# Patient Record
Sex: MALE | Race: WHITE | NOT HISPANIC OR LATINO | Employment: FULL TIME | URBAN - METROPOLITAN AREA
[De-identification: names, ages, dates, MRNs, and addresses within clinical notes are randomized per-mention and may not be internally consistent; named-entity substitution may affect disease eponyms.]

---

## 2017-08-03 ENCOUNTER — TRANSCRIBE ORDERS (OUTPATIENT)
Dept: LAB | Facility: CLINIC | Age: 37
End: 2017-08-03

## 2017-08-03 ENCOUNTER — APPOINTMENT (OUTPATIENT)
Dept: LAB | Facility: CLINIC | Age: 37
End: 2017-08-03
Payer: COMMERCIAL

## 2017-08-03 DIAGNOSIS — Z00.8 HEALTH EXAMINATION IN POPULATION SURVEY: Primary | ICD-10-CM

## 2017-08-03 DIAGNOSIS — Z00.8 HEALTH EXAMINATION IN POPULATION SURVEY: ICD-10-CM

## 2017-08-03 LAB
CHOLEST SERPL-MCNC: 212 MG/DL (ref 50–200)
EST. AVERAGE GLUCOSE BLD GHB EST-MCNC: 111 MG/DL
HBA1C MFR BLD: 5.5 % (ref 4.2–6.3)
HDLC SERPL-MCNC: 32 MG/DL (ref 40–60)
LDLC SERPL CALC-MCNC: 135 MG/DL (ref 0–100)
TRIGL SERPL-MCNC: 225 MG/DL

## 2017-08-03 PROCEDURE — 83036 HEMOGLOBIN GLYCOSYLATED A1C: CPT

## 2017-08-03 PROCEDURE — 80061 LIPID PANEL: CPT

## 2017-08-03 PROCEDURE — 36415 COLL VENOUS BLD VENIPUNCTURE: CPT

## 2018-01-15 NOTE — MISCELLANEOUS
Message   Recorded as Task   Date: 05/25/2016 11:23 AM, Created By: Rosalinda Carvajal   Task Name: Med Renewal Request   Assigned To: Genaro inman,Team   Regarding Patient: Baron Gonsalez, Status: Active   Comment:    Rosalinda Carvajal - 25 May 2016 11:23 AM     TASK CREATED  Caller: [facility], Pharmacist; Renew Medication; [phone]  Nadine Potter from [address] [place] [address] left  today at [phone] that they received (2) e-RX for pt for gabapentin  1 RX is gabapentin 300mg take 1 capsule at HS and the other RX is gabapentin 300mg take 2 capsules at HS -> they want to know which one is correct? [facility] [phone]   Billy Sawyer - 25 May 2016 1:17 PM     TASK REPLIED TO: Previously Assigned To YESSICA inman,Team  one tablet qhs   Shirley Agosto - 25 May 2016 1:51 PM     TASK EDITED  S/W Nadine Potter at [facility] and advised of the same  She said they will disregard the RX that said take 2 capsules QHS  Active Problems    1  Arm pain, right (729 5) (M79 601)   2  Cervical disc disorder with radiculopathy, mid-cervical region (723 4) (M50 12)   3  Cervical paraspinal muscle spasm (728 85) (M62 838)   4  Cervical radiculopathy (723 4) (M54 12)   5  Cervical spondylosis without myelopathy (721 0) (M47 812)    Current Meds   1  Gabapentin 300 MG Oral Capsule; TAKE 1 CAPSULE AT BEDTIME NIGHTLY; Therapy: 02Xxy5667 to (Evaluate:[id number])  Requested for: [id number]; Last   Rx:[id number] Ordered   2  MethylPREDNISolone 4 MG Oral Tablet Therapy Pack; TAKE AS PRESRIBED; Therapy: 73Wci3144 to (Shiloh Campuzano)  Requested for: 29Xmx3503; Last   Rx:[id number] Ordered    Allergies    1   No Known Drug Allergies    Signatures   Electronically signed by : Gilford Golden, ; May 25 2016  1:51PM EST                       (Author)

## 2018-01-18 NOTE — MISCELLANEOUS
Message   Recorded as Task   Date: 11/02/2016 10:10 AM, Created By: [id number]   [employer]   Task Name: Medical Complaint Callback   Assigned To: YESSICA Sorensen   Regarding Patient: Jani Jurado, Status: Active   Comment:    [address] - 02 Nov 2016 10:10 AM     TASK CREATED  Caller: Stevie Sosa, Spouse; Medical Complaint  TC from pt's wife stating he's had a flare up of his right arm pain over the past 1-2 wks and is asking if a steroid pack can be sent to [facility]? He has intermittent numbness & tingling  Best c/b is her work # [phone]  Told pt's wife I would d/w Dr Robinson French  Will you prescribe or does pt need sovs?   Wonda Alpers - 02 Nov 2016 10:23 AM     TASK REPLIED TO: Previously Assigned To [facility],Team  e-rx sent for medrol dosepak to [facility] - 74 Nov 2016 10:40 AM     TASK EDITED  Pt's wife was informed that a medrol dosepak was sent to [facility]  Pt is not to take any anti-inflammatory meds while taking the medrol dosepak  Wife verbalized understanding and appreciative  Active Problems    1  Arm pain, right (729 5) (M79 601)   2  Cervical disc disorder with radiculopathy, mid-cervical region (723 4) (M50 12)   3  Cervical paraspinal muscle spasm (728 85) (M62 838)   4  Cervical radiculopathy (723 4) (M54 12)   5  Cervical spondylosis without myelopathy (721 0) (M47 812)    Current Meds   1  MethylPREDNISolone 4 MG Oral Tablet Therapy Pack (Medrol); TAKE AS PRESRIBED; Therapy: 02YEU2870 to (Bill Price)  Requested for: 30AVL5658; Last   Rx:[id number] Ordered    Allergies    1   No Known Drug Allergies    Signatures   Electronically signed by : Elias Almodovar, ; Nov 2 2016 10:40AM EST                       (Author)

## 2018-01-19 ENCOUNTER — ALLSCRIPTS OFFICE VISIT (OUTPATIENT)
Dept: OTHER | Facility: OTHER | Age: 38
End: 2018-01-19

## 2018-01-23 VITALS
BODY MASS INDEX: 26.95 KG/M2 | RESPIRATION RATE: 16 BRPM | HEART RATE: 80 BPM | WEIGHT: 199 LBS | SYSTOLIC BLOOD PRESSURE: 132 MMHG | DIASTOLIC BLOOD PRESSURE: 74 MMHG | TEMPERATURE: 97.8 F | HEIGHT: 72 IN
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
## 2018-03-26 ENCOUNTER — OFFICE VISIT (OUTPATIENT)
Dept: FAMILY MEDICINE CLINIC | Facility: CLINIC | Age: 38
End: 2018-03-26
Payer: COMMERCIAL

## 2018-03-26 VITALS
BODY MASS INDEX: 27.09 KG/M2 | RESPIRATION RATE: 16 BRPM | HEIGHT: 72 IN | HEART RATE: 88 BPM | WEIGHT: 200 LBS | SYSTOLIC BLOOD PRESSURE: 114 MMHG | DIASTOLIC BLOOD PRESSURE: 54 MMHG | TEMPERATURE: 98.7 F

## 2018-03-26 DIAGNOSIS — I48.0 PAROXYSMAL ATRIAL FIBRILLATION (HCC): Primary | ICD-10-CM

## 2018-03-26 PROCEDURE — 99214 OFFICE O/P EST MOD 30 MIN: CPT | Performed by: FAMILY MEDICINE

## 2018-03-26 PROCEDURE — 93000 ELECTROCARDIOGRAM COMPLETE: CPT | Performed by: FAMILY MEDICINE

## 2018-03-26 RX ORDER — ASPIRIN 81 MG/1
81 TABLET, CHEWABLE ORAL DAILY
Qty: 60 TABLET | Refills: 0 | Status: SHIPPED | OUTPATIENT
Start: 2018-03-26

## 2018-03-26 RX ORDER — METOPROLOL SUCCINATE 25 MG/1
25 TABLET, EXTENDED RELEASE ORAL DAILY
Qty: 60 TABLET | Refills: 0 | Status: SHIPPED | OUTPATIENT
Start: 2018-03-26 | End: 2018-05-29 | Stop reason: SDUPTHER

## 2018-03-26 RX ORDER — METHYLPREDNISOLONE 4 MG/1
TABLET ORAL
COMMUNITY
Start: 2016-11-02 | End: 2018-10-30 | Stop reason: SDUPTHER

## 2018-03-26 NOTE — PROGRESS NOTES
Assessment/Plan:  1  Atrial Fibrillation   -25 mg Metoprolol daily  -Cards referral  -ECHO   -TSH, CBC, BMP  -f/u in 4 weeks with PCP  -begin ASA 81mg daily    CHADs2-VAS 0  EKG in clinic shows rate 107 with irregularly irregular rhythm  Subjective:      Patient ID: Severiano Caper is a 44 y o  male  45 y/o male presents with 20 year histoy of intermittent palpitations  He states that the paipations are typically associated with exertion   He started getting palpations while in high school and they have not improved with age  Pt states that he feels that his heart is racing and that recently he has started to notice irregularities to the palpitations  Pt has no family hx or personal hx of a fib, CAD, or dysrhythmias   Palpitations are alleviated with holding his breath and breathing deeply and slowly  Denies Chest pain, SOB, exertional chest pain, palpitations with exertion, LE swelling, cough, dizziness associated with the palpitations, Hx or a fib  Palpitations   This is a chronic problem  The current episode started more than 1 year ago  The problem has been unchanged  Pertinent negatives include no abdominal pain, arthralgias, chest pain, chills, congestion, coughing, diaphoresis, fatigue, fever, headaches, nausea, numbness, rash, sore throat, vomiting or weakness  The symptoms are aggravated by exertion  The following portions of the patient's history were reviewed and updated as appropriate: allergies, current medications, past family history, past medical history, past social history, past surgical history and problem list     Review of Systems   Constitutional: Negative for chills, diaphoresis, fatigue and fever  HENT: Negative for congestion, ear discharge, facial swelling, hearing loss, rhinorrhea, sinus pain, sinus pressure, sneezing, sore throat, tinnitus and trouble swallowing  Eyes: Negative for pain, discharge and redness     Respiratory: Negative for cough, choking, chest tightness, shortness of breath, wheezing and stridor  Cardiovascular: Positive for palpitations  Negative for chest pain and leg swelling  Gastrointestinal: Negative for abdominal distention, abdominal pain, blood in stool, constipation, diarrhea, nausea and vomiting  Endocrine: Negative for cold intolerance, polydipsia and polyuria  Genitourinary: Negative for difficulty urinating, dysuria, enuresis, flank pain, frequency and hematuria  Musculoskeletal: Negative for arthralgias, back pain, gait problem and neck stiffness  Skin: Negative for rash and wound  Neurological: Negative for dizziness, seizures, syncope, weakness, numbness and headaches  Hematological: Negative for adenopathy  Psychiatric/Behavioral: Negative for agitation, confusion, hallucinations, sleep disturbance and suicidal ideas  All other systems reviewed and are negative  Objective: There were no vitals taken for this visit  Physical Exam   Constitutional: He is oriented to person, place, and time  He appears well-developed and well-nourished  No distress  HENT:   Head: Normocephalic and atraumatic  Eyes: Conjunctivae and EOM are normal  Pupils are equal, round, and reactive to light  Right eye exhibits no discharge  Left eye exhibits no discharge  Neck: Normal range of motion  Neck supple  Cardiovascular: Normal heart sounds and intact distal pulses  An irregularly irregular rhythm present  Tachycardia present  Exam reveals no gallop and no friction rub  No murmur heard  Pulmonary/Chest: Effort normal and breath sounds normal  No respiratory distress  He has no wheezes  He has no rales  He exhibits no tenderness  Abdominal: Soft  Bowel sounds are normal  He exhibits no distension and no mass  There is no tenderness  There is no rebound and no guarding  Musculoskeletal: He exhibits no edema or deformity  Neurological: He is alert and oriented to person, place, and time     Skin: Skin is warm and dry  No rash noted  He is not diaphoretic  No erythema  No pallor  Nursing note and vitals reviewed

## 2018-03-29 ENCOUNTER — APPOINTMENT (OUTPATIENT)
Dept: LAB | Facility: HOSPITAL | Age: 38
End: 2018-03-29
Payer: COMMERCIAL

## 2018-03-29 ENCOUNTER — HOSPITAL ENCOUNTER (OUTPATIENT)
Dept: NON INVASIVE DIAGNOSTICS | Facility: HOSPITAL | Age: 38
Discharge: HOME | End: 2018-03-29
Payer: COMMERCIAL

## 2018-03-29 DIAGNOSIS — I48.0 PAROXYSMAL ATRIAL FIBRILLATION (HCC): ICD-10-CM

## 2018-03-29 LAB
ANION GAP SERPL CALCULATED.3IONS-SCNC: 4 MMOL/L (ref 4–13)
BASOPHILS # BLD AUTO: 0.02 THOUSANDS/ΜL (ref 0–0.1)
BASOPHILS NFR BLD AUTO: 0 % (ref 0–1)
BUN SERPL-MCNC: 19 MG/DL (ref 5–25)
CALCIUM SERPL-MCNC: 9.4 MG/DL (ref 8.3–10.1)
CHLORIDE SERPL-SCNC: 108 MMOL/L (ref 100–108)
CO2 SERPL-SCNC: 28 MMOL/L (ref 21–32)
CREAT SERPL-MCNC: 0.83 MG/DL (ref 0.6–1.3)
EOSINOPHIL # BLD AUTO: 0.09 THOUSAND/ΜL (ref 0–0.61)
EOSINOPHIL NFR BLD AUTO: 1 % (ref 0–6)
ERYTHROCYTE [DISTWIDTH] IN BLOOD BY AUTOMATED COUNT: 13 % (ref 11.6–15.1)
GFR SERPL CREATININE-BSD FRML MDRD: 112 ML/MIN/1.73SQ M
GLUCOSE SERPL-MCNC: 87 MG/DL (ref 65–140)
HCT VFR BLD AUTO: 45.9 % (ref 36.5–49.3)
HGB BLD-MCNC: 15.5 G/DL (ref 12–17)
LYMPHOCYTES # BLD AUTO: 2.36 THOUSANDS/ΜL (ref 0.6–4.47)
LYMPHOCYTES NFR BLD AUTO: 32 % (ref 14–44)
MCH RBC QN AUTO: 31.7 PG (ref 26.8–34.3)
MCHC RBC AUTO-ENTMCNC: 33.8 G/DL (ref 31.4–37.4)
MCV RBC AUTO: 94 FL (ref 82–98)
MONOCYTES # BLD AUTO: 0.74 THOUSAND/ΜL (ref 0.17–1.22)
MONOCYTES NFR BLD AUTO: 10 % (ref 4–12)
NEUTROPHILS # BLD AUTO: 4.15 THOUSANDS/ΜL (ref 1.85–7.62)
NEUTS SEG NFR BLD AUTO: 57 % (ref 43–75)
NRBC BLD AUTO-RTO: 0 /100 WBCS
PLATELET # BLD AUTO: 298 THOUSANDS/UL (ref 149–390)
PMV BLD AUTO: 10.1 FL (ref 8.9–12.7)
POTASSIUM SERPL-SCNC: 4.2 MMOL/L (ref 3.5–5.3)
RBC # BLD AUTO: 4.89 MILLION/UL (ref 3.88–5.62)
SODIUM SERPL-SCNC: 140 MMOL/L (ref 136–145)
TSH SERPL DL<=0.05 MIU/L-ACNC: 1.6 UIU/ML (ref 0.36–3.74)
WBC # BLD AUTO: 7.38 THOUSAND/UL (ref 4.31–10.16)

## 2018-03-29 PROCEDURE — 85025 COMPLETE CBC W/AUTO DIFF WBC: CPT

## 2018-03-29 PROCEDURE — 93306 TTE W/DOPPLER COMPLETE: CPT | Performed by: INTERNAL MEDICINE

## 2018-03-29 PROCEDURE — 36415 COLL VENOUS BLD VENIPUNCTURE: CPT

## 2018-03-29 PROCEDURE — 93306 TTE W/DOPPLER COMPLETE: CPT

## 2018-03-29 PROCEDURE — 84443 ASSAY THYROID STIM HORMONE: CPT

## 2018-03-29 PROCEDURE — 80048 BASIC METABOLIC PNL TOTAL CA: CPT

## 2018-04-12 ENCOUNTER — OFFICE VISIT (OUTPATIENT)
Dept: CARDIOLOGY CLINIC | Facility: CLINIC | Age: 38
End: 2018-04-12
Payer: COMMERCIAL

## 2018-04-12 VITALS
WEIGHT: 199 LBS | HEART RATE: 76 BPM | DIASTOLIC BLOOD PRESSURE: 80 MMHG | SYSTOLIC BLOOD PRESSURE: 122 MMHG | HEIGHT: 72 IN | BODY MASS INDEX: 26.95 KG/M2 | OXYGEN SATURATION: 97 %

## 2018-04-12 DIAGNOSIS — I48.0 PAROXYSMAL ATRIAL FIBRILLATION (HCC): Primary | ICD-10-CM

## 2018-04-12 PROCEDURE — 93000 ELECTROCARDIOGRAM COMPLETE: CPT | Performed by: INTERNAL MEDICINE

## 2018-04-12 PROCEDURE — 99243 OFF/OP CNSLTJ NEW/EST LOW 30: CPT | Performed by: INTERNAL MEDICINE

## 2018-04-12 NOTE — PROGRESS NOTES
Consultation - Cardiology   Bharti Macias 44 y o  male MRN: [id number]    Encounter: [id number]    Assessment/Plan     Assessment:   Paroxysmal Atrial Fibrillation      Plan:    Paroxysmal Atrial Fibrillation: He is in normal sinus rhythm  [id number] = 0  Continue Metoprolol  Echocardiogram was normal  Check exercise stress test      History of Present Illness   Physician Requesting Consult: No att  providers found  Reason for Consult / Principal Problem: arrhythmia  HPI: Bharti Macias is a 44y o  year old male who presents with a history of arrhythmia since high school and has been intermittent  He typically has an episode of palpitations, lightheadedness with a wave of warmth  He sometimes holds his breath and it can improve  He has no prior history of syncope  He has a history of near syncope a few years ago  Recently he had an episode that was persistent  He went to his primary care physician and was found to be in atrial fibrillation  Today he is in sinus rhythm  He was started on Metoprolol and he has not had palpitations since starting the medication  No family history of CAD  Nonsmoker  Review of Systems   Constitution: Negative  HENT: Negative  Eyes: Negative  Cardiovascular: Negative  Respiratory: Negative  Endocrine: Negative  Hematologic/Lymphatic: Negative  Skin: Negative  Musculoskeletal: Negative  Gastrointestinal: Negative  Genitourinary: Negative  Neurological: Negative  Psychiatric/Behavioral: Negative  Allergic/Immunologic: Negative  Historical Information   Past Medical History:   Diagnosis Date    [facility]fib [facility])     Palpitations      History reviewed  No pertinent surgical history      Social History:  History   Alcohol Use    Yes     Comment: social      History   Drug Use No     History   Smoking Status    Never Smoker   Smokeless Tobacco    Never Used       Family History:   Family History   Problem Relation Age of Onset    Hyperlipidemia Father     Arrhythmia Maternal Grandfather     Atrial fibrillation Maternal Grandfather     Stroke Paternal Grandfather        Meds/Allergies   No Known Allergies    Current Outpatient Prescriptions:     aspirin 81 mg chewable tablet, Chew 1 tablet (81 mg total) daily, Disp: 60 tablet, Rfl: 0    metoprolol succinate (TOPROL-XL) 25 mg 24 hr tablet, Take 1 tablet (25 mg total) by mouth daily, Disp: 60 tablet, Rfl: 0    Methylprednisolone 4 MG TBPK, Take by mouth, Disp: , Rfl:     Vitals:   Pulse: 76  Blood Pressue: 122/80  Weight: 90 3 kg (199 lb)      Physical Exam   Constitutional: He is oriented to person, place, and time  No distress  HENT:   Mouth/Throat: No oropharyngeal exudate  Eyes: No scleral icterus  Neck: No JVD present  Cardiovascular: Normal rate and regular rhythm  No murmur heard  Pulmonary/Chest: Effort normal and breath sounds normal  No respiratory distress  He has no wheezes  He has no rales  Abdominal: Soft  Bowel sounds are normal  He exhibits no distension  There is no tenderness  There is no rebound  Musculoskeletal: He exhibits no edema  Neurological: He is alert and oriented to person, place, and time  Skin: Skin is warm and dry  He is not diaphoretic  Psychiatric: He has a normal mood and affect  His behavior is normal        [unfilled]    Invasive Devices          No matching active lines, drains, or airways          Lab Results   Component Value Date     03/29/2018     03/29/2018    CO2 28 03/29/2018    ANIONGAP 4 03/29/2018    BUN 19 03/29/2018    CREATININE 0 83 03/29/2018    EGFR 112 03/29/2018    GLUCOSE 87 03/29/2018    CALCIUM 9 4 03/29/2018     Lab Results   Component Value Date    WBC 7 38 03/29/2018    HGB 15 5 03/29/2018     03/29/2018     No components found for: TROP    Imaging:     EKG: Normal Sinus Rhythm  Normal ECG  Counseling / Coordination of Care  Total floor / unit time spent today 45 minutes    Greater than 50% of total time was spent with the patient and / or family counseling and / or coordination of care  A description of the counseling / coordination of care

## 2018-05-29 DIAGNOSIS — I48.0 PAROXYSMAL ATRIAL FIBRILLATION (HCC): ICD-10-CM

## 2018-05-29 RX ORDER — METOPROLOL SUCCINATE 25 MG/1
25 TABLET, EXTENDED RELEASE ORAL DAILY
Qty: 90 TABLET | Refills: 1 | Status: SHIPPED | OUTPATIENT
Start: 2018-05-29 | End: 2019-01-16 | Stop reason: SDUPTHER

## 2018-08-22 ENCOUNTER — TRANSCRIBE ORDERS (OUTPATIENT)
Dept: LAB | Facility: CLINIC | Age: 38
End: 2018-08-22

## 2018-08-22 ENCOUNTER — APPOINTMENT (OUTPATIENT)
Dept: LAB | Facility: CLINIC | Age: 38
End: 2018-08-22

## 2018-08-22 DIAGNOSIS — Z00.8 HEALTH EXAMINATION IN POPULATION SURVEY: ICD-10-CM

## 2018-08-22 DIAGNOSIS — Z00.8 HEALTH EXAMINATION IN POPULATION SURVEY: Primary | ICD-10-CM

## 2018-08-22 LAB
CHOLEST SERPL-MCNC: 206 MG/DL (ref 50–200)
EST. AVERAGE GLUCOSE BLD GHB EST-MCNC: 103 MG/DL
HBA1C MFR BLD: 5.2 % (ref 4.2–6.3)
HDLC SERPL-MCNC: 43 MG/DL (ref 40–60)
LDLC SERPL CALC-MCNC: 131 MG/DL (ref 0–100)
NONHDLC SERPL-MCNC: 163 MG/DL
TRIGL SERPL-MCNC: 160 MG/DL

## 2018-08-22 PROCEDURE — 83036 HEMOGLOBIN GLYCOSYLATED A1C: CPT

## 2018-08-22 PROCEDURE — 36415 COLL VENOUS BLD VENIPUNCTURE: CPT

## 2018-08-22 PROCEDURE — 80061 LIPID PANEL: CPT

## 2018-10-30 DIAGNOSIS — M54.2 NECK PAIN: Primary | ICD-10-CM

## 2018-10-30 RX ORDER — METHYLPREDNISOLONE 4 MG/1
TABLET ORAL
Qty: 21 TABLET | Refills: 0 | Status: SHIPPED | OUTPATIENT
Start: 2018-10-30 | End: 2019-08-18 | Stop reason: ALTCHOICE

## 2018-12-14 ENCOUNTER — OFFICE VISIT (OUTPATIENT)
Dept: FAMILY MEDICINE CLINIC | Facility: CLINIC | Age: 38
End: 2018-12-14
Payer: COMMERCIAL

## 2018-12-14 ENCOUNTER — HOSPITAL ENCOUNTER (OUTPATIENT)
Dept: RADIOLOGY | Age: 38
Discharge: HOME | End: 2018-12-14
Payer: COMMERCIAL

## 2018-12-14 VITALS
RESPIRATION RATE: 14 BRPM | HEIGHT: 72 IN | WEIGHT: 205.4 LBS | DIASTOLIC BLOOD PRESSURE: 68 MMHG | TEMPERATURE: 98.5 F | SYSTOLIC BLOOD PRESSURE: 114 MMHG | HEART RATE: 74 BPM | BODY MASS INDEX: 27.82 KG/M2

## 2018-12-14 DIAGNOSIS — N45.1 EPIDIDYMITIS: Primary | ICD-10-CM

## 2018-12-14 DIAGNOSIS — R30.0 DYSURIA: ICD-10-CM

## 2018-12-14 DIAGNOSIS — N45.1 EPIDIDYMITIS: ICD-10-CM

## 2018-12-14 LAB
SL AMB  POCT GLUCOSE, UA: NEGATIVE
SL AMB LEUKOCYTE ESTERASE,UA: ABNORMAL
SL AMB POCT BILIRUBIN,UA: NEGATIVE
SL AMB POCT BLOOD,UA: ABNORMAL
SL AMB POCT CLARITY,UA: CLEAR
SL AMB POCT COLOR,UA: ABNORMAL
SL AMB POCT KETONES,UA: NEGATIVE
SL AMB POCT NITRITE,UA: NEGATIVE
SL AMB POCT PH,UA: 5
SL AMB POCT SPECIFIC GRAVITY,UA: 1.03
SL AMB POCT URINE PROTEIN: ABNORMAL
SL AMB POCT UROBILINOGEN: 0.2

## 2018-12-14 PROCEDURE — 87491 CHLMYD TRACH DNA AMP PROBE: CPT | Performed by: FAMILY MEDICINE

## 2018-12-14 PROCEDURE — 87086 URINE CULTURE/COLONY COUNT: CPT | Performed by: FAMILY MEDICINE

## 2018-12-14 PROCEDURE — 1036F TOBACCO NON-USER: CPT | Performed by: FAMILY MEDICINE

## 2018-12-14 PROCEDURE — 81001 URINALYSIS AUTO W/SCOPE: CPT | Performed by: FAMILY MEDICINE

## 2018-12-14 PROCEDURE — 87591 N.GONORRHOEAE DNA AMP PROB: CPT | Performed by: FAMILY MEDICINE

## 2018-12-14 PROCEDURE — 81003 URINALYSIS AUTO W/O SCOPE: CPT | Performed by: FAMILY MEDICINE

## 2018-12-14 PROCEDURE — 3008F BODY MASS INDEX DOCD: CPT | Performed by: FAMILY MEDICINE

## 2018-12-14 PROCEDURE — 76870 US EXAM SCROTUM: CPT

## 2018-12-14 PROCEDURE — 99213 OFFICE O/P EST LOW 20 MIN: CPT | Performed by: FAMILY MEDICINE

## 2018-12-14 RX ORDER — CEFTRIAXONE SODIUM 250 MG/1
250 INJECTION, POWDER, FOR SOLUTION INTRAMUSCULAR; INTRAVENOUS ONCE
Status: COMPLETED | OUTPATIENT
Start: 2018-12-14 | End: 2018-12-14

## 2018-12-14 RX ORDER — DOXYCYCLINE 100 MG/1
100 CAPSULE ORAL 2 TIMES DAILY
Qty: 20 CAPSULE | Refills: 0 | Status: SHIPPED | OUTPATIENT
Start: 2018-12-14 | End: 2018-12-24

## 2018-12-14 RX ADMIN — CEFTRIAXONE SODIUM 250 MG: 250 INJECTION, POWDER, FOR SOLUTION INTRAMUSCULAR; INTRAVENOUS at 15:17
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
## 2018-12-14 NOTE — ASSESSMENT & PLAN NOTE
- Physical exam findings consistent with epididymitis, differential includes testicular torsion  - Ceftriaxone 250 mg IM given in office   - Doxycycline 100 mg BID for 10 days   - UA and urine gonorrhea/chlamydia sent   - STAT testicular/scrotum US with doppler ordered   - Return precautions discussed

## 2018-12-17 LAB

## 2018-12-18 LAB
C TRACH DNA SPEC QL NAA+PROBE: NEGATIVE
N GONORRHOEA DNA SPEC QL NAA+PROBE: NEGATIVE

## 2018-12-19 ENCOUNTER — TELEPHONE (OUTPATIENT)
Dept: FAMILY MEDICINE CLINIC | Facility: CLINIC | Age: 38
End: 2018-12-19

## 2018-12-19 LAB — BACTERIA UR CULT: NORMAL

## 2018-12-19 NOTE — TELEPHONE ENCOUNTER
Called patient to discuss results, no answer but left message to call back- please let him know all his results were normal  Thank you!

## 2019-01-16 DIAGNOSIS — I48.0 PAROXYSMAL ATRIAL FIBRILLATION (HCC): ICD-10-CM

## 2019-01-16 RX ORDER — METOPROLOL SUCCINATE 25 MG/1
25 TABLET, EXTENDED RELEASE ORAL DAILY
Qty: 90 TABLET | Refills: 1 | Status: SHIPPED | OUTPATIENT
Start: 2019-01-16 | End: 2019-07-31 | Stop reason: SDUPTHER

## 2019-02-04 ENCOUNTER — OFFICE VISIT (OUTPATIENT)
Dept: FAMILY MEDICINE CLINIC | Facility: CLINIC | Age: 39
End: 2019-02-04

## 2019-02-04 VITALS
TEMPERATURE: 98.3 F | HEIGHT: 72 IN | HEART RATE: 78 BPM | RESPIRATION RATE: 18 BRPM | DIASTOLIC BLOOD PRESSURE: 70 MMHG | WEIGHT: 214 LBS | SYSTOLIC BLOOD PRESSURE: 118 MMHG | BODY MASS INDEX: 28.99 KG/M2

## 2019-02-04 DIAGNOSIS — I48.91 ATRIAL FIBRILLATION, UNSPECIFIED TYPE (HCC): Primary | ICD-10-CM

## 2019-02-04 PROCEDURE — 99213 OFFICE O/P EST LOW 20 MIN: CPT | Performed by: FAMILY MEDICINE

## 2019-02-04 NOTE — PROGRESS NOTES
Merary Gonzalez 1980 male MRN: [id number]    Family Medicine Acute Visit    ASSESSMENT/PLAN   Problem List Items Addressed This Visit        Cardiovascular and Mediastinum    A-fib ([facility] ) - Primary     Asymptomatic  POCT EKG-12 lead: NSR, no acute changes   Continue Metoprolol succinate 25 mg daily  Continue to f/u w/ Cardiologist as scheduled  No further testing   - used to consume excessive caffeine from soda in the past, that might be a trigger for PAF  No more caffeine-use since he was diagnosed with PAF  - no medical restrictions for work  - pt is cleared for public treat of injury or illness arising from PAF    F/U as needed  SUBJECTIVE  CC: Follow-up (A-FIB)      HPI:  Merary Gonzalez is a 43 y o  male who presents for PAF f/u  Asymptomatic for a long time  F/u w/ cardiologist recently, stable on Metoprolol  Review of Systems   Constitutional: Negative for chills, diaphoresis, fatigue and fever  Eyes: Negative for visual disturbance  Respiratory: Negative for cough, shortness of breath and wheezing  Cardiovascular: Negative for chest pain and palpitations  Gastrointestinal: Negative for abdominal pain  Neurological: Negative for dizziness, weakness, light-headedness and numbness  Psychiatric/Behavioral: The patient is not nervous/anxious  Historical Information   The patient history was reviewed and updated as follows:  Past Medical History:   Diagnosis Date    A-fib ([facility] )     Palpitations          History reviewed  No pertinent surgical history    Family History   Problem Relation Age of Onset    Hyperlipidemia Father     Arrhythmia Maternal Grandfather     Atrial fibrillation Maternal Grandfather     Stroke Paternal Grandfather       Social History   History   Alcohol Use    Yes     Comment: social      History   Drug Use No     History   Smoking Status    Never Smoker   Smokeless Tobacco    Never Used       Medications:     Current Outpatient Prescriptions:   metoprolol succinate (TOPROL-XL) 25 mg 24 hr tablet, Take 1 tablet (25 mg total) by mouth daily, Disp: 90 tablet, Rfl: 1    aspirin 81 mg chewable tablet, Chew 1 tablet (81 mg total) daily (Patient not taking: Reported on 12/14/2018 ), Disp: 60 tablet, Rfl: 0    Methylprednisolone 4 MG TBPK, Take 24 mg by mouth on day 1, then decreasing by 4mg/day for next 5 days per package instruction (Patient not taking: Reported on 2/4/2019 ), Disp: 21 tablet, Rfl: 0    No Known Allergies    OBJECTIVE  Vitals:   Vitals:    02/04/19 1528   BP: 118/70   Pulse: 78   Resp: 18   Temp: 98 3 °F (36 8 °C)   Weight: 97 1 kg (214 lb)   Height: 6' (1 829 m)         Physical Exam   Constitutional: He is oriented to person, place, and time  He appears well-developed and well-nourished  No distress  Cardiovascular: Normal rate and regular rhythm  No murmur heard  Pulmonary/Chest: Effort normal and breath sounds normal  No respiratory distress  He has no wheezes  He has no rales  Abdominal: Soft  Bowel sounds are normal    Musculoskeletal: Normal range of motion  He exhibits no edema  Neurological: He is alert and oriented to person, place, and time  Skin: Skin is warm  He is not diaphoretic  No erythema  Psychiatric: He has a normal mood and affect  Nursing note and vitals reviewed       Monique Sanabria MD, PGY-2  [facility]   2/4/2019

## 2019-02-04 NOTE — ASSESSMENT & PLAN NOTE
Asymptomatic  POCT EKG-12 lead: NSR, no acute changes   Continue Metoprolol succinate 25 mg daily  Continue to f/u w/ Cardiologist as scheduled  No further testing   - used to consume excessive caffeine from soda in the past, that might be a trigger for PAF  No more caffeine-use since he was diagnosed with PAF  - no medical restrictions for work  - pt is cleared for public treat of injury or illness arising from PAF    F/U as needed

## 2019-07-31 DIAGNOSIS — I48.0 PAROXYSMAL ATRIAL FIBRILLATION (HCC): ICD-10-CM

## 2019-07-31 RX ORDER — METOPROLOL SUCCINATE 25 MG/1
25 TABLET, EXTENDED RELEASE ORAL DAILY
Qty: 90 TABLET | Refills: 2 | Status: SHIPPED | OUTPATIENT
Start: 2019-07-31 | End: 2020-05-13 | Stop reason: SDUPTHER

## 2019-08-18 ENCOUNTER — OFFICE VISIT (OUTPATIENT)
Dept: URGENT CARE | Age: 39
End: 2019-08-18
Payer: COMMERCIAL

## 2019-08-18 VITALS
HEART RATE: 76 BPM | DIASTOLIC BLOOD PRESSURE: 72 MMHG | TEMPERATURE: 97.6 F | RESPIRATION RATE: 18 BRPM | SYSTOLIC BLOOD PRESSURE: 136 MMHG | OXYGEN SATURATION: 96 % | WEIGHT: 223 LBS | BODY MASS INDEX: 30.2 KG/M2 | HEIGHT: 72 IN

## 2019-08-18 DIAGNOSIS — M54.2 CERVICALGIA: Primary | ICD-10-CM

## 2019-08-18 PROCEDURE — S9088 SERVICES PROVIDED IN URGENT: HCPCS | Performed by: FAMILY MEDICINE

## 2019-08-18 PROCEDURE — 99213 OFFICE O/P EST LOW 20 MIN: CPT | Performed by: FAMILY MEDICINE

## 2019-08-18 RX ORDER — PREDNISONE 10 MG/1
TABLET ORAL
Qty: 21 TABLET | Refills: 0 | Status: SHIPPED | OUTPATIENT
Start: 2019-08-18 | End: 2021-12-01 | Stop reason: SDUPTHER

## 2019-08-18 NOTE — PROGRESS NOTES
[email or web address] [facility]        NAME: Delvin Sanchez is a 35 y o  male  : 1980    MRN: [id number]  DATE: 2019  TIME: 6:19 PM    Assessment and Plan   Cervicalgia [M54 2]  1  Cervicalgia  predniSONE 10 mg tablet         Patient Instructions       Follow up with PCP in 3-5 days  Proceed to  ER if symptoms worsen  Chief Complaint     Chief Complaint   Patient presents with    Neck Pain     Pt  has an exacerbation of inflammation from a herniated cervical disc for 3 days  Usually takes a steroid and it improves  having difficulty holding head up  and right shoulder is higher than his left  History of Present Illness       Patient is here for evaluation of neck pain  Patient has a history of herniated disc in neck and has had flare ups like this in the past   States usually he is on a course of steroids which helped relieve the symptoms  He does follow up with a specialist       Review of Systems   Review of Systems   Constitutional: Negative  Musculoskeletal: Positive for neck pain and neck stiffness  Neurological: Negative for weakness and numbness           Current Medications       Current Outpatient Medications:     metoprolol succinate (TOPROL-XL) 25 mg 24 hr tablet, Take 1 tablet (25 mg total) by mouth daily, Disp: 90 tablet, Rfl: 2    aspirin 81 mg chewable tablet, Chew 1 tablet (81 mg total) daily (Patient not taking: Reported on 2018 ), Disp: 60 tablet, Rfl: 0    predniSONE 10 mg tablet, Six tablets day 1; 5 tablets day to; 4 tablets day 3; 3 tablets day 4; 2 tablets day 5; and 1 tablet day 6, Disp: 21 tablet, Rfl: 0    Current Allergies     Allergies as of 2019    (No Known Allergies)            The following portions of the patient's history were reviewed and updated as appropriate: allergies, current medications, past family history, past medical history, past social history, past surgical history and problem list      Past Medical History:   Diagnosis Date    A-fib (HCC)     Bulging of cervical intervertebral disc     Herniated disc, cervical     Palpitations        History reviewed  No pertinent surgical history  Family History   Problem Relation Age of Onset    Hyperlipidemia Father     Arrhythmia Maternal Grandfather     Atrial fibrillation Maternal Grandfather     Stroke Paternal Grandfather          Medications have been verified  Objective   /72 (BP Location: Left arm, Patient Position: Sitting, Cuff Size: Large)   Pulse 76   Temp 97 6 °F (36 4 °C) (Temporal)   Resp 18   Ht 6' (1 829 m)   Wt 101 kg (223 lb)   SpO2 96%   BMI 30 24 kg/m²        Physical Exam     Physical Exam   Constitutional: He is oriented to person, place, and time  He appears well-developed and well-nourished  No distress  HENT:   Head: Normocephalic and atraumatic  Neck:   Range of motion of the cervical spine limited  Tenderness over the right cervical paravertebral muscles  Musculoskeletal:   Strength 5/5 bilaterally upper extremity  Neurological: He is alert and oriented to person, place, and time  He displays normal reflexes  No sensory deficit  Skin: Skin is warm and dry  He is not diaphoretic  Psychiatric: He has a normal mood and affect  His behavior is normal  Judgment and thought content normal    Nursing note and vitals reviewed

## 2019-08-19 ENCOUNTER — OFFICE VISIT (OUTPATIENT)
Dept: FAMILY MEDICINE CLINIC | Facility: CLINIC | Age: 39
End: 2019-08-19

## 2019-08-19 VITALS
TEMPERATURE: 97.7 F | RESPIRATION RATE: 16 BRPM | HEART RATE: 80 BPM | HEIGHT: 72 IN | BODY MASS INDEX: 30.02 KG/M2 | DIASTOLIC BLOOD PRESSURE: 80 MMHG | WEIGHT: 221.6 LBS | SYSTOLIC BLOOD PRESSURE: 130 MMHG

## 2019-08-19 DIAGNOSIS — M54.2 NECK PAIN: Primary | ICD-10-CM

## 2019-08-19 PROCEDURE — 99213 OFFICE O/P EST LOW 20 MIN: CPT | Performed by: FAMILY MEDICINE

## 2019-08-19 PROCEDURE — 1036F TOBACCO NON-USER: CPT | Performed by: FAMILY MEDICINE

## 2019-08-19 PROCEDURE — 3008F BODY MASS INDEX DOCD: CPT | Performed by: FAMILY MEDICINE

## 2019-08-19 RX ORDER — KETOROLAC TROMETHAMINE 30 MG/ML
30 INJECTION, SOLUTION INTRAMUSCULAR; INTRAVENOUS ONCE
Status: COMPLETED | OUTPATIENT
Start: 2019-08-19 | End: 2019-08-19

## 2019-08-19 RX ADMIN — KETOROLAC TROMETHAMINE 30 MG: 30 INJECTION, SOLUTION INTRAMUSCULAR; INTRAVENOUS at 10:52

## 2019-08-19 NOTE — LETTER
August 19, 2019     Patient: Clemente Kwok   YOB: 1980   Date of Visit: 8/19/2019       To Whom it May Concern:    Clemente Kwok is under my professional care  He was seen in my office on 8/19/2019  He may return to work on August 20, 2019  If you have any questions or concerns, please don't hesitate to call           Sincerely,          Servando Gallegos MD        CC: No Recipients

## 2019-08-19 NOTE — PROGRESS NOTES
Assessment/Plan:     Neck Pain  Patient denotes improvement with prednisone due to neck pain likely associated with patients cervical disc herniation  Patient should continue course of prednisone  Patient to receive IM Toradol for symptomatic relief  Discussed role OMT therapy can play if symptoms recur or become more frequent  Subjective:  Chief Complaint   Patient presents with    Neck Pain        Patient ID: Saul Caba is a 35 y o  male  Patient is presenting with increased pain due to cervical disc herniation  He was diagnosed with C6-7 disc herniation 3 years ago and rejected surgical options  Patient says that pain is typically well controlled with the exception of ocasional flare-ups where he has a decrease in cervical motion with pain radiating down upper extremity  He also endorses that his head "feels heavy"  Patient stated that current episode of pain began 6 days ago with no acute trigger  Patient reports progressive worsening of pain leading to a visit to urgent care yesterday where he was given prednisone 10 mg tablets to be taken 6 times daily  Patient reports taking 3 tablets last night and 6 tablets this morning leading to gradual improvement in symptoms  Patient's wife is nurse and recommended IM Toradol for pain relief  Review of Systems   Constitutional: Negative  Respiratory: Negative  Cardiovascular: Negative  Objective:  Vitals:    08/19/19 1012   BP: 130/80   Pulse: 80   Resp: 16   Temp: 97 7 °F (36 5 °C)       Physical Exam   Constitutional: He is oriented to person, place, and time  Cardiovascular: Normal rate, normal heart sounds and intact distal pulses  Pulmonary/Chest: Effort normal and breath sounds normal    Musculoskeletal:        Right shoulder: He exhibits spasm          Arms:  Sitting with neck in hyperflexion, SCM on right side enlarged compared to left  Palpable spasm over right trapezius    Neurological: He is alert and oriented to person, place, and time  He displays normal reflexes          Shoulder asymmetry: Right shoulder height about 2-3 inches superior to left shoulder

## 2020-02-21 ENCOUNTER — OFFICE VISIT (OUTPATIENT)
Dept: FAMILY MEDICINE CLINIC | Facility: CLINIC | Age: 40
End: 2020-02-21

## 2020-02-21 VITALS
RESPIRATION RATE: 16 BRPM | BODY MASS INDEX: 29.42 KG/M2 | TEMPERATURE: 99.6 F | HEART RATE: 80 BPM | SYSTOLIC BLOOD PRESSURE: 110 MMHG | DIASTOLIC BLOOD PRESSURE: 72 MMHG | HEIGHT: 72 IN | WEIGHT: 217.2 LBS

## 2020-02-21 DIAGNOSIS — R68.89 FLU-LIKE SYMPTOMS: Primary | ICD-10-CM

## 2020-02-21 PROBLEM — J00 COMMON COLD: Status: ACTIVE | Noted: 2020-02-21

## 2020-02-21 PROCEDURE — 3008F BODY MASS INDEX DOCD: CPT | Performed by: FAMILY MEDICINE

## 2020-02-21 PROCEDURE — 1036F TOBACCO NON-USER: CPT | Performed by: FAMILY MEDICINE

## 2020-02-21 PROCEDURE — 99213 OFFICE O/P EST LOW 20 MIN: CPT | Performed by: FAMILY MEDICINE

## 2020-02-21 NOTE — ASSESSMENT & PLAN NOTE
- The patient presents with a 4 day history of common cold symptoms including productive cough, general malaise, and fatigue    - Etiology is likely viral since patient reports his symptoms have improved over the last few days with rest and analgesics   - Encouraged the patient to maintain adequate hydration with sufficient fluid intake to prevent dehydration symptoms  - Recommended analgesics for relief of malaise, headache, fever, and general body aches  - Instructed the patient to return to clinic if his symptoms do not improve within the next week or if his symptoms worsen  - Counseled patient on ER parameters and the benefits of the flu vaccine  - Provided the patient with a work absence note  Patient may return to work on Monday

## 2020-02-21 NOTE — LETTER
February 21, 2020     Patient: Megan Og   YOB: 1980   Date of Visit: 2/21/2020       To Whom it May Concern:    Megan Og is under my professional care  He was seen in my office on 2/21/2020  Patient has symptom consistent with influenza starting on 2/18/2020  As this is an infectious disease, he is excused from work between 2/18/2020 to 2/23/2020  He may return to work on 2/24/2020 provided he has been fever free for 24 hours  If you have any questions or concerns, please don't hesitate to call           Sincerely,          Lynette Medina MD        CC: No Recipients

## 2020-02-21 NOTE — PROGRESS NOTES
Assessment/Plan:    Flu-like symptoms  - The patient presents with a 4 day history of flu-like symptoms including productive cough, general malaise, and fatigue    - Etiology is likely viral since patient reports his symptoms have improved over the last few days with rest and analgesics   - Encouraged the patient to maintain adequate hydration with sufficient fluid intake to prevent dehydration symptoms, recommend at least 2L, 64oz a day  - Recommended analgesics for relief of malaise, headache, fever, and general body aches  - Instructed the patient to return to clinic if his symptoms do not improve within the next week or if his symptoms worsen  - Counseled patient on ER parameters and the benefits of the flu vaccine  - Provided the patient with a work absence note  Patient may return to work on Monday  Subjective:      Patient ID: Echo Dash is a 35 y o  male who is here today for assessment of flu like symptoms for the last 4 days  He reports general improvement in his symptoms over the last few days  His current symptoms include productive cough with white to green phlegm, general malaise, and fatigue  The patient currently does not have a fever  He had a fever on Tuesday and Wednesday with Tmax was 101  He also previously experienced chills associated with his fever, sore throat, and nasal congestion  He has been self-medicating himself with Advil 800 mg q12h and Nyquil at bedtime which have provided symptomatic relief  The patient reports that he has decreased PO intake and has been eating and drinking less than his normal amount  He has experienced decrease urine output  He has not had a bowl movement since Wednesday which he describes as loose stools  He is currently experiencing symptoms of dehydration including lightheadedness and dizziness  The patient has missed the last 3 days of work  He is requesting a work absence note today  He feels that he can return to work on Monday       Of note, the patient has not received his annual flu vaccine this year  He was informed of the benefits of the flu vaccine but is not interested in receiving the vaccine  He does have sick contacts at work  He denies recent travels and tobacco use/exposure  Review of Systems   Constitutional: Positive for appetite change and fatigue  Negative for chills and fever  HENT: Negative for congestion, ear pain, hearing loss, postnasal drip, rhinorrhea, sinus pressure, sinus pain, sneezing, sore throat and trouble swallowing  Hoarseness   Eyes: Negative for pain, discharge, redness, itching and visual disturbance  Respiratory: Positive for cough (productive cough)  Negative for chest tightness, shortness of breath and wheezing  Cardiovascular: Negative for chest pain, palpitations and leg swelling  Gastrointestinal: Positive for diarrhea (2 episodes of loose stools)  Negative for abdominal pain, blood in stool, constipation, nausea and vomiting  Genitourinary: Negative for decreased urine volume, dysuria, frequency, hematuria and urgency  Musculoskeletal: Negative for back pain, joint swelling, neck pain and neck stiffness  General body aches   Skin: Negative  Neurological: Positive for dizziness and light-headedness  Negative for syncope and headaches  Psychiatric/Behavioral: Negative  Negative for self-injury, sleep disturbance and suicidal ideas  Objective:    /72 (BP Location: Left arm, Patient Position: Sitting, Cuff Size: Large)   Pulse 80   Temp 99 6 °F (37 6 °C) (Tympanic)   Resp 16   Ht 6' (1 829 m)   Wt 98 5 kg (217 lb 3 2 oz)   BMI 29 46 kg/m²      Physical Exam   Constitutional: He appears well-developed and well-nourished  HENT:   Head: Normocephalic and atraumatic  Nose: Nose normal    Eyes: Right eye exhibits no discharge  Left eye exhibits no discharge  Neck: Neck supple  Cardiovascular: Normal rate and regular rhythm     No murmur heard   Pulmonary/Chest: Effort normal and breath sounds normal  No stridor  No respiratory distress  He has no wheezes  Abdominal: Soft  Bowel sounds are normal  He exhibits no distension  Musculoskeletal: He exhibits no edema, tenderness or deformity  Lymphadenopathy:     He has no cervical adenopathy  Neurological: He is alert  Skin: Skin is warm and dry  Capillary refill takes less than 2 seconds  No rash noted  No erythema  No pallor  Psychiatric: He has a normal mood and affect  Vitals reviewed  JESSICA Bradshaw    Family Medicine, PGY-2

## 2020-02-21 NOTE — PATIENT INSTRUCTIONS
Influenza   AMBULATORY CARE:   Influenza  (the flu) is an infection caused by the influenza virus  The flu is easily spread when an infected person coughs, sneezes, or has close contact with others  You may be able to spread the flu to others for 1 week or longer after signs or symptoms appear  Common signs and symptoms include the following:   · Fever and chills    · Headaches, body aches, and muscle or joint pain    · Cough, runny nose, and sore throat    · Loss of appetite, nausea, vomiting, or diarrhea    · Tiredness    · Trouble breathing  Call 911 for any of the following:   · You have trouble breathing, and your lips look purple or blue  · You have a seizure  Seek care immediately if:   · You are dizzy, or you are urinating less or not at all  · You have a headache with a stiff neck, and you feel tired or confused  · You have new pain or pressure in your chest     · Your symptoms, such as shortness of breath, vomiting, or diarrhea, get worse  · Your symptoms, such as fever and coughing, seem to get better, but then get worse  Contact your healthcare provider if:   · You have new muscle pain or weakness  · You have questions or concerns about your condition or care  Treatment for influenza  may include any of the following:  · Acetaminophen  decreases pain and fever  It is available without a doctor's order  Ask how much to take and how often to take it  Follow directions  Acetaminophen can cause liver damage if not taken correctly  · NSAIDs , such as ibuprofen, help decrease swelling, pain, and fever  This medicine is available with or without a doctor's order  NSAIDs can cause stomach bleeding or kidney problems in certain people  If you take blood thinner medicine, always ask your healthcare provider if NSAIDs are safe for you  Always read the medicine label and follow directions  · Antivirals  help fight a viral infection    Manage your symptoms:   · Rest  as much as you can to help you recover  · Drink liquids as directed  to help prevent dehydration  Ask how much liquid to drink each day and which liquids are best for you  Prevent the spread of the flu:   · Wash your hands often  Use soap and water  Wash your hands after you use the bathroom, change a child's diapers, or sneeze  Wash your hands before you prepare or eat food  Use gel hand cleanser when soap and water are not available  Do not touch your eyes, nose, or mouth unless you have washed your hands first            · Cover your mouth when you sneeze or cough  Cough into a tissue or the bend of your arm  · Clean shared items with a germ-killing   Clean table surfaces, doorknobs, and light switches  Do not share towels, silverware, and dishes with people who are sick  Wash bed sheets, towels, silverware, and dishes with soap and water  · Wear a mask  over your mouth and nose if you are sick or are near anyone who is sick  · Stay away from others  if you are sick  · Influenza vaccine  helps prevent influenza (flu)  Everyone older than 6 months should get a yearly influenza vaccine  Get the vaccine as soon as it is available, usually in September or October each year  Follow up with your healthcare provider as directed:  Write down your questions so you remember to ask them during your visits  © 2017 [facility] Information is for End User's use only and may not be sold, redistributed or otherwise used for commercial purposes  All illustrations and images included in CareNotes® are the copyrighted property of A D [employer] M [employer] Inc  or [employer]  The above information is an  only  It is not intended as medical advice for individual conditions or treatments  Talk to your doctor, nurse or pharmacist before following any medical regimen to see if it is safe and effective for you

## 2020-05-13 DIAGNOSIS — I48.0 PAROXYSMAL ATRIAL FIBRILLATION (HCC): ICD-10-CM

## 2020-05-13 RX ORDER — METOPROLOL SUCCINATE 25 MG/1
25 TABLET, EXTENDED RELEASE ORAL DAILY
Qty: 90 TABLET | Refills: 2 | Status: SHIPPED | OUTPATIENT
Start: 2020-05-13 | End: 2021-02-15 | Stop reason: SDUPTHER

## 2021-02-15 DIAGNOSIS — I48.0 PAROXYSMAL ATRIAL FIBRILLATION (HCC): ICD-10-CM

## 2021-02-15 RX ORDER — METOPROLOL SUCCINATE 25 MG/1
25 TABLET, EXTENDED RELEASE ORAL DAILY
Qty: 90 TABLET | Refills: 0 | Status: SHIPPED | OUTPATIENT
Start: 2021-02-15 | End: 2021-05-10

## 2021-03-10 DIAGNOSIS — Z23 ENCOUNTER FOR IMMUNIZATION: ICD-10-CM

## 2021-03-21 ENCOUNTER — IMMUNIZATIONS (OUTPATIENT)
Dept: FAMILY MEDICINE CLINIC | Facility: HOSPITAL | Age: 41
End: 2021-03-21

## 2021-03-21 DIAGNOSIS — Z23 ENCOUNTER FOR IMMUNIZATION: Primary | ICD-10-CM

## 2021-03-21 PROCEDURE — 0001A SARS-COV-2 / COVID-19 MRNA VACCINE (PFIZER-BIONTECH) 30 MCG: CPT

## 2021-03-21 PROCEDURE — 91300 SARS-COV-2 / COVID-19 MRNA VACCINE (PFIZER-BIONTECH) 30 MCG: CPT

## 2021-04-11 ENCOUNTER — IMMUNIZATIONS (OUTPATIENT)
Dept: FAMILY MEDICINE CLINIC | Facility: HOSPITAL | Age: 41
End: 2021-04-11

## 2021-04-11 DIAGNOSIS — Z23 ENCOUNTER FOR IMMUNIZATION: Primary | ICD-10-CM

## 2021-04-11 PROCEDURE — 0002A SARS-COV-2 / COVID-19 MRNA VACCINE (PFIZER-BIONTECH) 30 MCG: CPT

## 2021-04-11 PROCEDURE — 91300 SARS-COV-2 / COVID-19 MRNA VACCINE (PFIZER-BIONTECH) 30 MCG: CPT

## 2021-05-09 DIAGNOSIS — I48.0 PAROXYSMAL ATRIAL FIBRILLATION (HCC): ICD-10-CM

## 2021-05-10 ENCOUNTER — OFFICE VISIT (OUTPATIENT)
Dept: FAMILY MEDICINE CLINIC | Facility: CLINIC | Age: 41
End: 2021-05-10

## 2021-05-10 VITALS
BODY MASS INDEX: 31.26 KG/M2 | RESPIRATION RATE: 18 BRPM | HEIGHT: 72 IN | DIASTOLIC BLOOD PRESSURE: 80 MMHG | TEMPERATURE: 98.5 F | OXYGEN SATURATION: 96 % | WEIGHT: 230.8 LBS | SYSTOLIC BLOOD PRESSURE: 130 MMHG | HEART RATE: 90 BPM

## 2021-05-10 DIAGNOSIS — K21.9 GASTROESOPHAGEAL REFLUX DISEASE WITHOUT ESOPHAGITIS: ICD-10-CM

## 2021-05-10 DIAGNOSIS — E66.09 CLASS 1 OBESITY DUE TO EXCESS CALORIES WITHOUT SERIOUS COMORBIDITY WITH BODY MASS INDEX (BMI) OF 31.0 TO 31.9 IN ADULT: ICD-10-CM

## 2021-05-10 DIAGNOSIS — I48.91 ATRIAL FIBRILLATION, UNSPECIFIED TYPE (HCC): Primary | ICD-10-CM

## 2021-05-10 PROCEDURE — 99213 OFFICE O/P EST LOW 20 MIN: CPT | Performed by: FAMILY MEDICINE

## 2021-05-10 PROCEDURE — 3725F SCREEN DEPRESSION PERFORMED: CPT | Performed by: FAMILY MEDICINE

## 2021-05-10 RX ORDER — METOPROLOL SUCCINATE 25 MG/1
TABLET, EXTENDED RELEASE ORAL
Qty: 90 TABLET | Refills: 0 | Status: SHIPPED | OUTPATIENT
Start: 2021-05-10 | End: 2021-08-13 | Stop reason: SDUPTHER

## 2021-05-10 RX ORDER — FAMOTIDINE 20 MG/1
20 TABLET, FILM COATED ORAL 2 TIMES DAILY
Qty: 30 TABLET | Refills: 1 | Status: SHIPPED | OUTPATIENT
Start: 2021-05-10

## 2021-05-10 NOTE — ASSESSMENT & PLAN NOTE
Acid reflux symptoms well controlled on Nexium  Patient has been using Nexium for roughly 1 year  Discussed lifestyle modification to improve as reflux symptoms including limiting caffeine use and avoiding reclining/sleeping immediately after meals  Will switch Nexium to famotidine for next 2 weeks and observe symptoms  Weight loss may help with the symptoms

## 2021-05-10 NOTE — PROGRESS NOTES
Assessment/Plan:    A-fib [facility])  Patient with paroxysmal atrial fibrillation diagnosed in 2018 well controlled on metoprolol  Continue this medication  Patient may consider starting aspirin 81 mg  Patient denies any history of blood clots including DVT or PE  Due to concern about exertional dyspnea (this may be secondary to deconditioning during pandemic or beta-blocker use), will repeat echocardiogram at this time  Will also obtain stress test per recommendation from Cardiology back in 2018    Gastroesophageal reflux disease without esophagitis  Acid reflux symptoms well controlled on Nexium  Patient has been using Nexium for roughly 1 year  Discussed lifestyle modification to improve as reflux symptoms including limiting caffeine use and avoiding reclining/sleeping immediately after meals  Will switch Nexium to famotidine for next 2 weeks and observe symptoms  Weight loss may help with the symptoms        Subjective:      Patient ID: Umesh Tim is a 38 y o  male  HPI    79-year-old male patient presents for follow-up regarding his chronic atrial fibrillation as well as acid reflux symptoms  Patient has a history of arrhythmia since he was a teenager, has been evaluated by Cardiology, was diagnosed with paroxysmal atrial fibrillation in 2018  Patient reports having occasional palpitations symptoms but has not noticed any increased frequency  Patient's echocardiogram was last completed in 2018 showing ejection fraction of 60%  without regional wall motion abnormality or evidence of concentric hypertrophy  Patient is concerned about some exertional dyspnea, specifically feels more winded after climbing 2 flights of stairs holding the laundry basket  Patient's only medication is metoprolol tartrate 25 mg daily, and has been compliant with this medication  Patient does not drink excess amount of caffeine, occasionally drinks [facility] during the day and occasional sweet tea and night      Patient has not been taking aspirin 81 mg tablet  Patient has been using Nexium for roughly 1 ER to control his as reflux symptoms  Patient does eat a large dinner around 7:00 p m  And recline and rest afterwards and occasionally fall asleep  Review of Systems   Constitutional: Negative for chills and fatigue  HENT: Negative for congestion, rhinorrhea and sore throat  Respiratory: Negative for shortness of breath  Cardiovascular: Negative for chest pain and palpitations  Gastrointestinal: Positive for nausea  Negative for abdominal pain, constipation, diarrhea and vomiting  Occasional nausea with distant history of emesis secondary to acid reflux   Genitourinary: Negative for dysuria and urgency  Neurological: Negative for dizziness, light-headedness and headaches  Psychiatric/Behavioral: Negative for sleep disturbance  Objective:    /80 (BP Location: Right arm, Patient Position: Sitting, Cuff Size: Standard)   Pulse 90   Temp 98 5 °F (36 9 °C) (Tympanic)   Resp 18   Ht 6' (1 829 m)   Wt 105 kg (230 lb 12 8 oz)   SpO2 96%   BMI 31 30 kg/m²       Physical Exam  Vitals signs reviewed  Constitutional:       General: He is not in acute distress  Appearance: Normal appearance  He is obese  He is not ill-appearing, toxic-appearing or diaphoretic  HENT:      Head: Normocephalic  Cardiovascular:      Rate and Rhythm: Normal rate and regular rhythm  Pulses: Normal pulses  Heart sounds: Normal heart sounds  No murmur  Pulmonary:      Effort: Pulmonary effort is normal  No respiratory distress  Breath sounds: Normal breath sounds  Abdominal:      General: Abdomen is flat  Bowel sounds are normal  There is no distension  Palpations: Abdomen is soft  Musculoskeletal: Normal range of motion  General: No swelling, tenderness, deformity or signs of injury  Lymphadenopathy:      Cervical: No cervical adenopathy  Skin:     General: Skin is warm and dry  Capillary Refill: Capillary refill takes less than 2 seconds  Coloration: Skin is not jaundiced  Neurological:      General: No focal deficit present  Mental Status: He is alert and oriented to person, place, and time  Cranial Nerves: No cranial nerve deficit  Psychiatric:         Mood and Affect: Mood normal           JESSICA Acuña  Family Medicine, PGY-3    Please excuse any "sound-alike" errors that may have ocurred during the process of dictation  Parts of this note have been dictated and there may be errors present in the transcription process  Thank you

## 2021-05-10 NOTE — ASSESSMENT & PLAN NOTE
Patient with paroxysmal atrial fibrillation diagnosed in 2018 well controlled on metoprolol  Continue this medication  Patient may consider starting aspirin 81 mg  Patient denies any history of blood clots including DVT or PE  Due to concern about exertional dyspnea (this may be secondary to deconditioning during pandemic or beta-blocker use), will repeat echocardiogram at this time  Will also obtain stress test per recommendation from Cardiology back in 2018

## 2021-06-07 ENCOUNTER — OFFICE VISIT (OUTPATIENT)
Dept: FAMILY MEDICINE CLINIC | Facility: CLINIC | Age: 41
End: 2021-06-07

## 2021-06-07 VITALS
TEMPERATURE: 97.9 F | OXYGEN SATURATION: 99 % | RESPIRATION RATE: 18 BRPM | WEIGHT: 224.2 LBS | HEART RATE: 80 BPM | SYSTOLIC BLOOD PRESSURE: 110 MMHG | HEIGHT: 72 IN | DIASTOLIC BLOOD PRESSURE: 70 MMHG | BODY MASS INDEX: 30.37 KG/M2

## 2021-06-07 DIAGNOSIS — I48.91 ATRIAL FIBRILLATION, UNSPECIFIED TYPE (HCC): ICD-10-CM

## 2021-06-07 DIAGNOSIS — Z00.00 ANNUAL PHYSICAL EXAM: Primary | ICD-10-CM

## 2021-06-07 PROCEDURE — 99396 PREV VISIT EST AGE 40-64: CPT | Performed by: FAMILY MEDICINE

## 2021-06-07 PROCEDURE — 3008F BODY MASS INDEX DOCD: CPT | Performed by: FAMILY MEDICINE

## 2021-06-07 PROCEDURE — 1036F TOBACCO NON-USER: CPT | Performed by: FAMILY MEDICINE

## 2021-06-07 NOTE — PROGRESS NOTES
[address] [facility] [facility] [place]    NAME: Destiny Cadet  AGE: 38 y o  SEX: male  : 1980     DATE: 2021     Assessment and Plan:     Problem List Items Addressed This Visit        Cardiovascular and Mediastinum    A-fib ([address] )     Stable  No afib on exam today  Encouraged patient to complete echo cardiogram            Other    Annual physical exam - Primary     No acute concerns   Please complete blood work provided in May  Consider using sunscreen when outside for more than 15 minutes  Follow up in 6 month               Immunizations and preventive care screenings were discussed with patient today  Appropriate education was printed on patient's after visit summary  Counseling:  Alcohol/drug use: discussed moderation in alcohol intake, the recommendations for healthy alcohol use, and avoidance of illicit drug use  Dental Health: discussed importance of regular tooth brushing, flossing, and dental visits  Injury prevention: discussed safety/seat belts, safety helmets, smoke detectors, carbon dioxide detectors, and smoking near bedding or upholstery  Sexual health: discussed sexually transmitted diseases, partner selection, use of condoms, avoidance of unintended pregnancy, and contraceptive alternatives  · Exercise: the importance of regular exercise/physical activity was discussed  Recommend exercise 3-5 times per week for at least 30 minutes  Return in about 6 months (around 2021), or follow up blood work  Chief Complaint:     Chief Complaint   Patient presents with    Physical Exam      History of Present Illness:     Adult Annual Physical   Patient here for a comprehensive physical exam  The patient reports no problems  Patient did trial for motivating for few weeks but  Did not think it was effective in treating his acid reflux so he returned to using Nexium     Patient's weight on May 10, 2020 to 30 lb, down to 224 today  Denies any tobbaco, drugs  Drinks 4-5 beers in the weekend  Diet and Physical Activity  · Diet/Nutrition: well balanced diet decreased and to carry consumption  · 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  Depression Screening  PHQ-9 Depression Screening    PHQ-9:   Frequency of the following problems over the past two weeks:           General Health  · Sleep: sleeps well, snores loudly and denies any apnea or gasping  · Hearing: normal - bilateral   · Vision: no vision problems  · Dental: no dental visits for >1 year   Health   · Symptoms include: none     Review of Systems:     Review of Systems   Constitutional: Negative for chills and fever  HENT: Negative for congestion, rhinorrhea and sore throat  Respiratory: Negative for cough and shortness of breath  Cardiovascular: Negative for chest pain and palpitations  Gastrointestinal: Negative for abdominal pain, blood in stool, constipation, diarrhea, nausea and vomiting  Genitourinary: Negative for dysuria and hematuria  Musculoskeletal: Negative for gait problem  Skin: Negative for rash  Neurological: Negative for dizziness, light-headedness and headaches  Psychiatric/Behavioral: Negative for self-injury, sleep disturbance and suicidal ideas  Past Medical History:     Past Medical History:   Diagnosis Date    A-fib ([facility] )     Bulging of cervical intervertebral disc     Herniated disc, cervical     Palpitations       Past Surgical History:     History reviewed  No pertinent surgical history     Family History:     Family History   Problem Relation Age of Onset    Hyperlipidemia Father     Arrhythmia Maternal Grandfather     Atrial fibrillation Maternal Grandfather     Stroke Paternal Grandfather       Social History:     E-Cigarette/Vaping    E-Cigarette Use Never User      E-Cigarette/Vaping Substances    Nicotine No     THC No     CBD No     Flavoring No     Other No     Unknown No Social History     Socioeconomic History    Marital status: /Civil Union     Spouse name: None    Number of children: None    Years of education: None    Highest education level: None   Occupational History    None   Social Needs    Financial resource strain: Not hard at all   [address] insecurity     Worry: Never true     Inability: Never true   [employer] needs     Medical: No     Non-medical: No   Tobacco Use    Smoking status: Never Smoker    Smokeless tobacco: Never Used   Substance and Sexual Activity    Alcohol use: Yes     Comment: social     Drug use: No    Sexual activity: Yes     Partners: Female   Lifestyle    Physical activity     Days per week: None     Minutes per session: None    Stress: None   Relationships    Social connections     Talks on phone: None     Gets together: None     Attends Mandaeism service: None     Active member of club or organization: None     Attends meetings of clubs or organizations: None     Relationship status: None    Intimate partner violence     Fear of current or ex partner: None     Emotionally abused: None     Physically abused: None     Forced sexual activity: None   Other Topics Concern    None   Social History Narrative    None      Current Medications:     Current Outpatient Medications   Medication Sig Dispense Refill    esomeprazole (NexIUM) 20 mg capsule Take 20 mg by mouth every morning before breakfast      famotidine (PEPCID) 20 mg tablet Take 1 tablet (20 mg total) by mouth 2 (two) times a day 30 tablet 1    metoprolol succinate (TOPROL-XL) 25 mg 24 hr tablet TAKE 1 TABLET BY MOUTH EVERY DAY 90 tablet 0    aspirin 81 mg chewable tablet Chew 1 tablet (81 mg total) daily (Patient not taking: Reported on 5/10/2021) 60 tablet 0    predniSONE 10 mg tablet Six tablets day 1; 5 tablets day to; 4 tablets day 3; 3 tablets day 4; 2 tablets day 5; and 1 tablet day 6 (Patient not taking: Reported on 5/10/2021) 21 tablet 0     No current facility-administered medications for this visit  Allergies:     No Known Allergies   Physical Exam:     /70 (BP Location: Left arm, Patient Position: Sitting, Cuff Size: Standard)   Pulse 80   Temp 97 9 °F (36 6 °C) (Tympanic)   Resp 18   Ht 6' (1 829 m)   Wt 102 kg (224 lb 3 2 oz)   SpO2 99%   BMI 30 41 kg/m²     Physical Exam  Vitals signs reviewed  Constitutional:       Appearance: Normal appearance  He is obese  HENT:      Head: Normocephalic  Nose: Nose normal       Mouth/Throat:      Mouth: Mucous membranes are moist    Eyes:      Extraocular Movements: Extraocular movements intact  Pupils: Pupils are equal, round, and reactive to light  Neck:      Vascular: No carotid bruit  Cardiovascular:      Rate and Rhythm: Normal rate and regular rhythm  Pulses: Normal pulses  Heart sounds: Normal heart sounds  No murmur  Pulmonary:      Effort: Pulmonary effort is normal  No respiratory distress  Breath sounds: Normal breath sounds  Abdominal:      General: Abdomen is flat  Bowel sounds are normal  There is no distension  Palpations: Abdomen is soft  Musculoskeletal: Normal range of motion  General: No swelling or tenderness  Lymphadenopathy:      Cervical: No cervical adenopathy  Skin:     General: Skin is warm and dry  Capillary Refill: Capillary refill takes less than 2 seconds  Comments: Mild erythema from sunburn   Neurological:      General: No focal deficit present  Mental Status: He is alert and oriented to person, place, and time  Cranial Nerves: No cranial nerve deficit     Psychiatric:         Mood and Affect: Mood normal           Fadi Ramires MD  [address]

## 2021-06-07 NOTE — ASSESSMENT & PLAN NOTE
No acute concerns   Please complete blood work provided in May  Consider using sunscreen when outside for more than 15 minutes  Follow up in 6 month

## 2021-06-07 NOTE — PATIENT INSTRUCTIONS

## 2021-08-13 DIAGNOSIS — I48.0 PAROXYSMAL ATRIAL FIBRILLATION (HCC): ICD-10-CM

## 2021-08-13 RX ORDER — METOPROLOL SUCCINATE 25 MG/1
25 TABLET, EXTENDED RELEASE ORAL DAILY
Qty: 90 TABLET | Refills: 1 | Status: SHIPPED | OUTPATIENT
Start: 2021-08-13 | End: 2022-02-07

## 2021-12-16 ENCOUNTER — IMMUNIZATIONS (OUTPATIENT)
Dept: FAMILY MEDICINE CLINIC | Facility: HOSPITAL | Age: 41
End: 2021-12-16

## 2021-12-16 DIAGNOSIS — Z23 ENCOUNTER FOR IMMUNIZATION: Primary | ICD-10-CM

## 2021-12-16 PROCEDURE — 91300 COVID-19 PFIZER VACC 0.3 ML: CPT

## 2021-12-16 PROCEDURE — 0001A COVID-19 PFIZER VACC 0.3 ML: CPT

## 2022-02-07 DIAGNOSIS — I48.0 PAROXYSMAL ATRIAL FIBRILLATION (HCC): ICD-10-CM

## 2022-02-07 RX ORDER — METOPROLOL SUCCINATE 25 MG/1
TABLET, EXTENDED RELEASE ORAL
Qty: 45 TABLET | Refills: 0 | Status: SHIPPED | OUTPATIENT
Start: 2022-02-07 | End: 2022-03-10

## 2022-02-07 NOTE — TELEPHONE ENCOUNTER
Patient last seen 6/2021  45 Day refill given to patient  Please schedule a follow up for blood pressure so we can continue to refill his medications

## 2022-04-04 DIAGNOSIS — I48.0 PAROXYSMAL ATRIAL FIBRILLATION (HCC): ICD-10-CM

## 2022-04-04 RX ORDER — METOPROLOL SUCCINATE 25 MG/1
TABLET, EXTENDED RELEASE ORAL
Qty: 90 TABLET | Refills: 1 | Status: SHIPPED | OUTPATIENT
Start: 2022-04-04

## 2022-10-13 DIAGNOSIS — I48.0 PAROXYSMAL ATRIAL FIBRILLATION (HCC): ICD-10-CM

## 2022-10-14 RX ORDER — METOPROLOL SUCCINATE 25 MG/1
TABLET, EXTENDED RELEASE ORAL
Refills: 0 | OUTPATIENT
Start: 2022-10-14

## 2022-10-14 NOTE — TELEPHONE ENCOUNTER
Patient needs an appointment, last seen was one year ago  Dr Jessa Garay send 30 days supply with "0" refills   Thanks

## 2022-12-28 ENCOUNTER — OFFICE VISIT (OUTPATIENT)
Dept: FAMILY MEDICINE CLINIC | Facility: CLINIC | Age: 42
End: 2022-12-28

## 2022-12-28 VITALS
DIASTOLIC BLOOD PRESSURE: 80 MMHG | HEART RATE: 90 BPM | OXYGEN SATURATION: 98 % | HEIGHT: 71 IN | SYSTOLIC BLOOD PRESSURE: 120 MMHG | TEMPERATURE: 98.2 F | RESPIRATION RATE: 20 BRPM | BODY MASS INDEX: 31.33 KG/M2 | WEIGHT: 223.8 LBS

## 2022-12-28 DIAGNOSIS — I48.0 PAROXYSMAL ATRIAL FIBRILLATION (HCC): ICD-10-CM

## 2022-12-28 DIAGNOSIS — Z00.00 ANNUAL PHYSICAL EXAM: Primary | ICD-10-CM

## 2022-12-28 RX ORDER — METOPROLOL SUCCINATE 25 MG/1
25 TABLET, EXTENDED RELEASE ORAL DAILY
Qty: 90 TABLET | Refills: 3 | Status: SHIPPED | OUTPATIENT
Start: 2022-12-28

## 2022-12-28 NOTE — ASSESSMENT & PLAN NOTE
Immunization:  - COVID -- 03/21/2021, 04/11/2021, 12/16/2021  - TDaP -- 5/8/14  Screening: discussed each screening test below with patient, which patient expresses understanding and is agreeable to plan  - Depression -- negative    - Nicotine/EtOH/Drugs use --  negative  Depression: Not on file   - Hypertension -- negative  120/80  - T2DM -- BMI of 30 87, however patient is very active and increased BMI is likely due to increased muscle mass from weight lifting  Will obtain CMP and will assess fasting glucose     Lab Results   Component Value Date    HGBA1C 5 2 08/22/2018    HGBA1C 5 5 08/03/2017    HGBA1C 5 5 08/10/2016     Lab Results   Component Value Date    GLUF 94 08/26/2014    LDLCALC 131 (H) 08/22/2018    CREATININE 0 83 03/29/2018   - HIV -- patient declined (everyone 15-66YO)  - Hep C -- patient declined (everyone 15-66YO)  No results found for: Svetlana Noriega

## 2022-12-28 NOTE — ASSESSMENT & PLAN NOTE
- Stable  - Metoprolol 25mg daily   - Does not experience symptoms on a regular basis  - exercises 3-4x a week including weight-lifting as well as cardio  - States that when he is doing cardiovascular exercises he feels short of breath and is not able to distinguish if it is derived from a-fib symptoms or just normal effects of exercise  Was scheduled for ECHO last year but order   Patient requested re-order of exam  Will also place EKG order as well as cardiology consult     - he is currently not on anti-coagulation as his [id number] score is 0    - referral for cardiology placed to establish care with provider and discuss further treatment if necessary

## 2022-12-28 NOTE — PROGRESS NOTES
[address] [facility] [place]    NAME: Radha Mckay  AGE: 36 y o  SEX: male  : 1980     DATE: 2022     Assessment and Plan:     Problem List Items Addressed This Visit        Cardiovascular and Mediastinum    A-fib ([address] )     - Stable  - Metoprolol 25mg daily   - Does not experience symptoms on a regular basis  - exercises 3-4x a week including weight-lifting as well as cardio  - States that when he is doing cardiovascular exercises he feels short of breath and is not able to distinguish if it is derived from a-fib symptoms or just normal effects of exercise  Was scheduled for ECHO last year but order   Patient requested re-order of exam  Will also place EKG order as well as cardiology consult  - he is currently not on anti-coagulation as his [id number] score is 0    - referral for cardiology placed to establish care with provider and discuss further treatment if necessary            Relevant Medications    metoprolol succinate (TOPROL-XL) 25 mg 24 hr tablet    Other Relevant Orders    Ambulatory Referral to Cardiology    Echo complete w/ contrast if indicated    ECG 12 lead       Other    Annual physical exam - Primary     Immunization:  - COVID -- 2021, 2021, 2021  - TDaP -- 14  Screening: discussed each screening test below with patient, which patient expresses understanding and is agreeable to plan  - Depression -- negative    - Nicotine/EtOH/Drugs use --  negative  Depression: Not on file   - Hypertension -- negative  120/80  - T2DM -- BMI of 30 87, however patient is very active and increased BMI is likely due to increased muscle mass from weight lifting  Will obtain CMP and will assess fasting glucose     Lab Results   Component Value Date    HGBA1C 5 2 2018    HGBA1C 5 5 2017    HGBA1C 5 5 08/10/2016     Lab Results   Component Value Date    GLUF 94 2014    [address] [place] 131 (H) 08/22/2018    CREATININE 0 83 03/29/2018   - HIV -- patient declined (everyone 15-66YO)  - Hep C -- patient declined (everyone 15-66YO)  No results found for: HIVAGAB, HEPCAB             Relevant Orders    Comprehensive metabolic panel    Lipid panel       Immunizations and preventive care screenings were discussed with patient today  Appropriate education was printed on patient's after visit summary  Discussed risks and benefits of prostate cancer screening  We discussed the controversial history of PSA screening for prostate cancer in the [place] as well as the risk of over detection and over treatment of prostate cancer by way of PSA screening  The patient understands that PSA blood testing is an imperfect way to screen for prostate cancer and that elevated PSA levels in the blood may also be caused by infection, inflammation, prostatic trauma or manipulation, urological procedures, or by benign prostatic enlargement  The role of the digital rectal examination in prostate cancer screening was also discussed and I discussed with him that there is large interobserver variability in the findings of digital rectal examination  Counseling:  Alcohol/drug use: discussed moderation in alcohol intake, the recommendations for healthy alcohol use, and avoidance of illicit drug use  Dental Health: discussed importance of regular tooth brushing, flossing, and dental visits  Injury prevention: discussed safety/seat belts, safety helmets, smoke detectors, carbon dioxide detectors, and smoking near bedding or upholstery  Sexual health: discussed sexually transmitted diseases, partner selection, use of condoms, avoidance of unintended pregnancy, and contraceptive alternatives  · Exercise: the importance of regular exercise/physical activity was discussed  Recommend exercise 3-5 times per week for at least 30 minutes  No follow-ups on file       Chief Complaint:     Chief Complaint   Patient presents with   • Annual Exam     No forms to be completed  No compaint      History of Present Illness:     Adult Annual Physical   Patient here for a comprehensive physical exam  The patient reports occasional L knee pain when bending down and climbing stairs  States that it is not bothersome enough to want to obtain X-ray or schedule PT for  States he attends the gym 4x/week and will focus more on leg exercises and stretches to strengthen it  Diet and Physical Activity  · Diet/Nutrition: well balanced diet  · Exercise: 3-4 times a week on average  Depression Screening  PHQ-2/9 Depression Screening    Little interest or pleasure in doing things: 0 - not at all  Feeling down, depressed, or hopeless: 0 - not at all  PHQ-2 Score: 0  PHQ-2 Interpretation: Negative depression screen       General Health  · Sleep: gets 4-6 hours of sleep on average  · Hearing: normal - bilateral   · Vision: no vision problems  · Dental: no dental visits for >1 year   Health  · Symptoms include: none     Review of Systems:     Review of Systems   Constitutional: Negative for chills and fever  HENT: Negative for sore throat  Respiratory: Negative for cough and shortness of breath  Cardiovascular: Negative for chest pain and palpitations  Gastrointestinal: Negative for abdominal pain, blood in stool, constipation, diarrhea, nausea and vomiting  Genitourinary: Negative for dysuria and hematuria  Musculoskeletal: Negative for arthralgias and back pain  Skin: Negative for color change and rash  Neurological: Negative for syncope and headaches  Psychiatric/Behavioral: Negative for agitation and behavioral problems  All other systems reviewed and are negative  Past Medical History:     Past Medical History:   Diagnosis Date   • A-fib ([facility] [address] )    • Bulging of cervical intervertebral disc    • Herniated disc, cervical    • Palpitations       Past Surgical History:     History reviewed   No pertinent surgical history  Family History:     Family History   Problem Relation Age of Onset   • Hyperlipidemia Father    • Arrhythmia Maternal Grandfather    • Atrial fibrillation Maternal Grandfather    • Stroke Paternal Grandfather       Social History:     Social History     Socioeconomic History   • Marital status: /Civil Union     Spouse name: None   • Number of children: None   • Years of education: None   • Highest education level: None   Occupational History   • None   Tobacco Use   • Smoking status: Never   • Smokeless tobacco: Never   Vaping Use   • Vaping Use: Never used   Substance and Sexual Activity   • Alcohol use: Yes     Comment: social    • Drug use: No   • Sexual activity: Yes     Partners: Female   Other Topics Concern   • None   Social History Narrative   • None     Social Determinants of Health     Financial Resource Strain: Low Risk    • Difficulty of Paying Living Expenses: Not hard at all   Food Insecurity: No Food Insecurity   • Worried About Running Out of Food in the Last Year: Never true   • Ran Out of Food in the Last Year: Never true   Transportation Needs: No Transportation Needs   • Lack of Transportation (Medical): No   • Lack of Transportation (Non-Medical): No   Physical Activity: Inactive   • Days of Exercise per Week: 0 days   • Minutes of Exercise per Session: 0 min   Stress: No Stress Concern Present   • Feeling of Stress : Not at all   Social Connections:  Moderately Isolated   • Frequency of Communication with Friends and Family: More than three times a week   • Frequency of Social Gatherings with Friends and Family: More than three times a week   • Attends [employer] Services: Never   • Active Member of Clubs or Organizations: No   • Attends Club or Organization Meetings: Never   • Marital Status:    Intimate Partner Violence: Not At Risk   • Fear of Current or Ex-Partner: No   • Emotionally Abused: No   • Physically Abused: No   • Sexually Abused: No   Housing Stability: Low Risk    • Unable to Pay for Housing in the Last Year: No   • Number of Places Lived in the Last Year: 1   • Unstable Housing in the Last Year: No      Current Medications:     Current Outpatient Medications   Medication Sig Dispense Refill   • aspirin 81 mg chewable tablet Chew 1 tablet (81 mg total) daily 60 tablet 0   • esomeprazole (NexIUM) 20 mg capsule Take 20 mg by mouth every morning before breakfast     • metoprolol succinate (TOPROL-XL) 25 mg 24 hr tablet Take 1 tablet (25 mg total) by mouth daily 90 tablet 3   • predniSONE 10 mg tablet Six tablets day 1; 5 tablets day to; 4 tablets day 3; 3 tablets day 4; 2 tablets day 5; and 1 tablet day 6 21 tablet 0   • famotidine (PEPCID) 20 mg tablet Take 1 tablet (20 mg total) by mouth 2 (two) times a day 30 tablet 1     No current facility-administered medications for this visit  Allergies:     No Known Allergies   Physical Exam:     /80 (BP Location: Right arm, Patient Position: Sitting, Cuff Size: Large)   Pulse 90   Temp 98 2 °F (36 8 °C) (Temporal)   Resp 20   Ht 5' 11 4" (1 814 m)   Wt 102 kg (223 lb 12 8 oz)   SpO2 98%   BMI 30 87 kg/m²     Physical Exam  Vitals and nursing note reviewed  Constitutional:       General: He is not in acute distress  Appearance: He is well-developed and normal weight  He is not ill-appearing  HENT:      Head: Normocephalic and atraumatic  Nose: Nose normal       Mouth/Throat:      Mouth: Mucous membranes are moist    Eyes:      Conjunctiva/sclera: Conjunctivae normal    Cardiovascular:      Rate and Rhythm: Normal rate and regular rhythm  Heart sounds: No murmur heard  Pulmonary:      Effort: Pulmonary effort is normal  No respiratory distress  Breath sounds: Normal breath sounds  Abdominal:      Palpations: Abdomen is soft  Tenderness: There is no abdominal tenderness  Musculoskeletal:         General: No swelling  Cervical back: Neck supple        Right lower leg: No edema  Left lower leg: No edema  Skin:     General: Skin is warm and dry  Capillary Refill: Capillary refill takes less than 2 seconds  Neurological:      General: No focal deficit present  Mental Status: He is alert and oriented to person, place, and time     Psychiatric:         Mood and Affect: Mood normal           Barb Sánchez, DO  [address]

## 2023-12-05 ENCOUNTER — HOSPITAL ENCOUNTER (OUTPATIENT)
Dept: NON INVASIVE DIAGNOSTICS | Facility: CLINIC | Age: 43
Discharge: HOME | End: 2023-12-05
Payer: COMMERCIAL

## 2023-12-05 VITALS
DIASTOLIC BLOOD PRESSURE: 80 MMHG | WEIGHT: 223 LBS | BODY MASS INDEX: 31.22 KG/M2 | SYSTOLIC BLOOD PRESSURE: 120 MMHG | HEART RATE: 70 BPM | HEIGHT: 71 IN

## 2023-12-05 DIAGNOSIS — I48.0 PAROXYSMAL ATRIAL FIBRILLATION (HCC): ICD-10-CM

## 2023-12-05 LAB
AORTIC ROOT: 2.1 CM
APICAL FOUR CHAMBER EJECTION FRACTION: 66 %
E WAVE DECELERATION TIME: 265 MS
E/A RATIO: 1.44
FRACTIONAL SHORTENING: 43 (ref 28–44)
INTERVENTRICULAR SEPTUM IN DIASTOLE (PARASTERNAL SHORT AXIS VIEW): 1.1 CM
INTERVENTRICULAR SEPTUM: 1.1 CM (ref 0.6–1.1)
LAAS-AP2: 16.9 CM2
LAAS-AP4: 11.2 CM2
LEFT ATRIUM AREA SYSTOLE SINGLE PLANE A4C: 12.4 CM2
LEFT ATRIUM SIZE: 3.4 CM
LEFT ATRIUM VOLUME (MOD BIPLANE): 32 ML
LEFT ATRIUM VOLUME INDEX (MOD BIPLANE): 14.4 ML/M2
LEFT INTERNAL DIMENSION IN SYSTOLE: 2.6 CM (ref 2.1–4)
LEFT VENTRICULAR INTERNAL DIMENSION IN DIASTOLE: 4.6 CM (ref 3.5–6)
LEFT VENTRICULAR POSTERIOR WALL IN END DIASTOLE: 1.1 CM
LEFT VENTRICULAR STROKE VOLUME: 75 ML
LVSV (TEICH): 75 ML
MV E'TISSUE VEL-SEP: 9 CM/S
MV PEAK A VEL: 0.5 M/S
MV PEAK E VEL: 72 CM/S
MV STENOSIS PRESSURE HALF TIME: 77 MS
MV VALVE AREA P 1/2 METHOD: 2.86
RIGHT ATRIUM AREA SYSTOLE A4C: 9.8 CM2
RIGHT VENTRICLE ID DIMENSION: 3.7 CM
SL CV LEFT ATRIUM LENGTH A2C: 4.6 CM
SL CV LV EF: 60
SL CV PED ECHO LEFT VENTRICLE DIASTOLIC VOLUME (MOD BIPLANE) 2D: 99 ML
SL CV PED ECHO LEFT VENTRICLE SYSTOLIC VOLUME (MOD BIPLANE) 2D: 24 ML
TRICUSPID ANNULAR PLANE SYSTOLIC EXCURSION: 2 CM

## 2023-12-05 PROCEDURE — 93306 TTE W/DOPPLER COMPLETE: CPT

## 2023-12-05 PROCEDURE — 93306 TTE W/DOPPLER COMPLETE: CPT | Performed by: INTERNAL MEDICINE

## 2023-12-06 ENCOUNTER — TELEPHONE (OUTPATIENT)
Dept: FAMILY MEDICINE CLINIC | Facility: CLINIC | Age: 43
End: 2023-12-06

## 2023-12-06 NOTE — TELEPHONE ENCOUNTER
Patient call requesting a new referral for Cardiologist.     The one in the system was entered incorrectly.   Thank you.

## 2023-12-07 DIAGNOSIS — I48.91 ATRIAL FIBRILLATION, UNSPECIFIED TYPE (HCC): Primary | ICD-10-CM

## 2023-12-28 ENCOUNTER — OFFICE VISIT (OUTPATIENT)
Dept: FAMILY MEDICINE CLINIC | Facility: CLINIC | Age: 43
End: 2023-12-28

## 2023-12-28 VITALS
OXYGEN SATURATION: 97 % | WEIGHT: 228.8 LBS | HEIGHT: 72 IN | BODY MASS INDEX: 30.99 KG/M2 | TEMPERATURE: 98.4 F | HEART RATE: 99 BPM | DIASTOLIC BLOOD PRESSURE: 82 MMHG | RESPIRATION RATE: 18 BRPM | SYSTOLIC BLOOD PRESSURE: 119 MMHG

## 2023-12-28 DIAGNOSIS — M54.50 CHRONIC BILATERAL LOW BACK PAIN WITHOUT SCIATICA: ICD-10-CM

## 2023-12-28 DIAGNOSIS — Z00.00 ANNUAL PHYSICAL EXAM: Primary | ICD-10-CM

## 2023-12-28 DIAGNOSIS — Z11.4 SCREENING FOR HIV (HUMAN IMMUNODEFICIENCY VIRUS): ICD-10-CM

## 2023-12-28 DIAGNOSIS — R06.09 DYSPNEA ON EXERTION: ICD-10-CM

## 2023-12-28 DIAGNOSIS — G89.29 CHRONIC BILATERAL LOW BACK PAIN WITHOUT SCIATICA: ICD-10-CM

## 2023-12-28 PROCEDURE — 99213 OFFICE O/P EST LOW 20 MIN: CPT | Performed by: FAMILY MEDICINE

## 2023-12-28 PROCEDURE — 99396 PREV VISIT EST AGE 40-64: CPT | Performed by: FAMILY MEDICINE

## 2023-12-28 NOTE — ASSESSMENT & PLAN NOTE
Due to heavy lifting, no red flag, no current back pain at this visit.  Patient stated when he had a flare, then prednisone 10 mg helped.  Advice patient on supportive care for his back, and call if back pain flares up.

## 2023-12-28 NOTE — PROGRESS NOTES
ADULT ANNUAL PHYSICAL  [facility]    NAME: Karthik Palmer  AGE: 43 y.o. SEX: male  : 1980     DATE: 2023     Assessment and Plan:     Problem List Items Addressed This Visit        Other    Annual physical exam - Primary     Doing reasonably well, encourage patient on lifestyle modification with diet/exercise.  Check screening labs.           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  Will check labs, discussed doing exercise stress test and PFT for further work up and patient agreed.         Relevant Orders    TSH, 3rd generation with Free T4 reflex    CBC and Platelet    Complete PFT with post bronchodilator    Stress test only, exercise    Chronic low back pain     Due to heavy lifting, no red flag, no current back pain at this visit.  Patient stated when he had a flare, then prednisone 10 mg helped.  Advice patient on supportive care for his back, and call if back pain flares up.        Other Visit Diagnoses     Screening for HIV (human immunodeficiency virus)        Relevant Orders    HIV 1/2 AB/AG w Reflex [facility] for 2 yr old and above    BMI 30.0-30.9,adult              Immunizations and preventive care screenings were discussed with patient today. Appropriate education was printed on patient's after visit summary.        Counseling:  Lifestyle modification    BMI Counseling: Body mass index is 30.94 kg/m². The BMI is above normal. Nutrition recommendations include decreasing portion sizes, encouraging healthy choices of fruits and vegetables, consuming healthier snacks and limiting drinks that contain sugar. Exercise recommendations include  moderate physical activity 150 minutes/week. Rationale for BMI follow-up plan is due to patient being overweight or obese.     Depression Screening and Follow-up Plan: Patient was screened for depression during today's encounter. They screened negative with a PHQ-2 score of 0.        Return in about 3 months (around 3/28/2024) for Next scheduled follow up shortness of breath.     Chief Complaint:     Chief Complaint   Patient presents with   • Physical Exam     No questions or concerns      History of Present Illness:     Adult Annual Physical   Patient here for a comprehensive physical exam. The patient reports problems - having a cold right now.  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    Diet and Physical Activity  Diet/Nutrition: limited junk food, high fat diet, limited fruits/vegetables, and some days were healthier .   Exercise: moderate cardiovascular exercise.      Depression Screening  PHQ-2/9 Depression Screening    Little interest or pleasure in doing things: 0 - not at all  Feeling down, depressed, or hopeless: 0 - not at all  PHQ-2 Score: 0  PHQ-2 Interpretation: Negative depression screen       General Health  Sleep: gets 4-6 hours of sleep on average and experiences daytime hypersomnolence.   Hearing: normal - bilateral.  Vision: no vision problems.   Dental: regular dental visits.        Health  Symptoms include: none    Advanced Care Planning  Do you have an advanced directive? no  Do you have a durable medical power of ? no     Review of Systems:     Review of Systems   Constitutional:  Negative for chills and fever.   HENT:  Negative for ear pain and sore throat.    Eyes:  Negative for pain and visual disturbance.   Respiratory:  Positive for shortness of breath. Negative for cough and chest tightness.    Cardiovascular:  Negative for chest pain and palpitations.   Gastrointestinal:  Negative for abdominal pain and vomiting.    Genitourinary:  Negative for dysuria and hematuria.   Musculoskeletal:  Negative for arthralgias and back pain.   Skin:  Negative for color change and rash.   Neurological:  Negative for seizures and syncope.   All other systems reviewed and are negative.     Past Medical History:     Past Medical History:   Diagnosis Date   • A-fib (HCC)    • Bulging of cervical intervertebral disc    • Herniated disc, cervical    • Palpitations       Past Surgical History:     No past surgical history on file.   Family History:     Family History   Problem Relation Age of Onset   • Hyperlipidemia Father    • Arrhythmia Maternal Grandfather    • Atrial fibrillation Maternal Grandfather    • Stroke Paternal Grandfather       Social History:     Social History     Socioeconomic History   • Marital status: /Civil Union     Spouse name: None   • Number of children: None   • Years of education: None   • Highest education level: None   Occupational History   • None   Tobacco Use   • Smoking status: Never   • Smokeless tobacco: Never   Vaping Use   • Vaping status: Never Used   Substance and Sexual Activity   • Alcohol use: Yes     Comment: social    • Drug use: No   • Sexual activity: Yes     Partners: Female   Other Topics Concern   • None   Social History Narrative   • None     Social Determinants of Health     Financial Resource Strain: Low Risk  (12/28/2023)    Overall Financial Resource Strain (CARDIA)    • Difficulty of Paying Living Expenses: Not hard at all   Food Insecurity: No Food Insecurity (12/28/2023)    Hunger Vital Sign    • Worried About Running Out of Food in the Last Year: Never true    • Ran Out of Food in the Last Year: Never true   Transportation Needs: No Transportation Needs (12/28/2023)    PRAPARE - Transportation    • Lack of Transportation (Medical): No    • Lack of Transportation (Non-Medical): No   Physical Activity: Sufficiently Active (12/28/2023)    Exercise Vital Sign    • Days of Exercise per  "Week: 3 days    • Minutes of Exercise per Session: 50 min   Stress: No Stress Concern Present (12/28/2023)    [employer] [place] [employer] - Occupational Stress Questionnaire    • Feeling of Stress : Only a little   Social Connections: Moderately Isolated (12/28/2023)    Social Connection and Isolation Panel [NHANES]    • Frequency of Communication with Friends and Family: Twice a week    • Frequency of Social Gatherings with Friends and Family: Once a week    • Attends [employer] Services: Never    • Active Member of Clubs or Organizations: No    • Attends Club or Organization Meetings: Never    • Marital Status:    Intimate Partner Violence: Not At Risk (12/28/2023)    Humiliation, Afraid, Rape, and Kick questionnaire    • Fear of Current or Ex-Partner: No    • Emotionally Abused: No    • Physically Abused: No    • Sexually Abused: No   Housing Stability: Low Risk  (12/28/2023)    Housing Stability Vital Sign    • Unable to Pay for Housing in the Last Year: No    • Number of Places Lived in the Last Year: 1    • Unstable Housing in the Last Year: No      Current Medications:     Current Outpatient Medications   Medication Sig Dispense Refill   • aspirin 81 mg chewable tablet Chew 1 tablet (81 mg total) daily 60 tablet 0   • esomeprazole (NexIUM) 20 mg capsule Take 20 mg by mouth every morning before breakfast     • metoprolol succinate (TOPROL-XL) 25 mg 24 hr tablet Take 1 tablet (25 mg total) by mouth daily 90 tablet 3     No current facility-administered medications for this visit.      Allergies:     No Known Allergies   Physical Exam:     /82 (BP Location: Left arm, Patient Position: Sitting, Cuff Size: Large)   Pulse 99   Temp 98.4 °F (36.9 °C) (Temporal)   Resp 18   Ht 6' 0.1\" (1.831 m)   Wt 104 kg (228 lb 12.8 oz)   SpO2 97%   BMI 30.94 kg/m²     Physical Exam  Vitals reviewed.   Constitutional:       General: He is not in acute distress.     Appearance: Normal appearance. He " is well-developed.   HENT:      Head: Normocephalic and atraumatic.   Eyes:      Conjunctiva/sclera: Conjunctivae normal.   Cardiovascular:      Rate and Rhythm: Normal rate and regular rhythm.      Pulses: Normal pulses.      Heart sounds: Normal heart sounds. No murmur heard.  Pulmonary:      Effort: Pulmonary effort is normal. No respiratory distress.      Breath sounds: Normal breath sounds.   Abdominal:      Palpations: Abdomen is soft.      Tenderness: There is no abdominal tenderness.   Musculoskeletal:         General: No swelling.      Cervical back: Neck supple.   Skin:     General: Skin is warm and dry.      Capillary Refill: Capillary refill takes less than 2 seconds.   Neurological:      Mental Status: He is alert.   Psychiatric:         Mood and Affect: Mood normal.          Hiral Agee MD  [facility]

## 2023-12-28 NOTE — ASSESSMENT & PLAN NOTE
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  Will check labs, discussed doing exercise stress test and PFT for further work up and patient agreed.

## 2023-12-28 NOTE — ASSESSMENT & PLAN NOTE
Doing reasonably well, encourage patient on lifestyle modification with diet/exercise.  Check screening labs.

## 2024-01-10 DIAGNOSIS — I48.0 PAROXYSMAL ATRIAL FIBRILLATION (HCC): ICD-10-CM

## 2024-01-10 RX ORDER — METOPROLOL SUCCINATE 25 MG/1
25 TABLET, EXTENDED RELEASE ORAL DAILY
Qty: 90 TABLET | Refills: 3 | Status: SHIPPED | OUTPATIENT
Start: 2024-01-10

## 2024-01-15 ENCOUNTER — APPOINTMENT (OUTPATIENT)
Dept: LAB | Facility: CLINIC | Age: 44
End: 2024-01-15
Payer: COMMERCIAL

## 2024-01-15 DIAGNOSIS — Z11.4 SCREENING FOR HIV (HUMAN IMMUNODEFICIENCY VIRUS): ICD-10-CM

## 2024-01-15 DIAGNOSIS — Z00.00 ANNUAL PHYSICAL EXAM: ICD-10-CM

## 2024-01-15 DIAGNOSIS — R06.09 DYSPNEA ON EXERTION: ICD-10-CM

## 2024-01-15 LAB
ALBUMIN SERPL BCP-MCNC: 4.5 G/DL (ref 3.5–5)
ALP SERPL-CCNC: 77 U/L (ref 34–104)
ALT SERPL W P-5'-P-CCNC: 21 U/L (ref 7–52)
ANION GAP SERPL CALCULATED.3IONS-SCNC: 6 MMOL/L
AST SERPL W P-5'-P-CCNC: 14 U/L (ref 13–39)
BILIRUB SERPL-MCNC: 0.37 MG/DL (ref 0.2–1)
BUN SERPL-MCNC: 26 MG/DL (ref 5–25)
CALCIUM SERPL-MCNC: 9.4 MG/DL (ref 8.4–10.2)
CHLORIDE SERPL-SCNC: 105 MMOL/L (ref 96–108)
CHOLEST SERPL-MCNC: 252 MG/DL
CO2 SERPL-SCNC: 28 MMOL/L (ref 21–32)
CREAT SERPL-MCNC: 0.88 MG/DL (ref 0.6–1.3)
ERYTHROCYTE [DISTWIDTH] IN BLOOD BY AUTOMATED COUNT: 12.2 % (ref 11.6–15.1)
GFR SERPL CREATININE-BSD FRML MDRD: 104 ML/MIN/1.73SQ M
GLUCOSE P FAST SERPL-MCNC: 94 MG/DL (ref 65–99)
HCT VFR BLD AUTO: 47.1 % (ref 36.5–49.3)
HDLC SERPL-MCNC: 39 MG/DL
HGB BLD-MCNC: 15.3 G/DL (ref 12–17)
HIV 1+2 AB+HIV1 P24 AG SERPL QL IA: NORMAL
HIV 2 AB SERPL QL IA: NORMAL
HIV1 AB SERPL QL IA: NORMAL
HIV1 P24 AG SERPL QL IA: NORMAL
LDLC SERPL CALC-MCNC: 187 MG/DL (ref 0–100)
MCH RBC QN AUTO: 31.4 PG (ref 26.8–34.3)
MCHC RBC AUTO-ENTMCNC: 32.5 G/DL (ref 31.4–37.4)
MCV RBC AUTO: 97 FL (ref 82–98)
NONHDLC SERPL-MCNC: 213 MG/DL
PLATELET # BLD AUTO: 286 THOUSANDS/UL (ref 149–390)
PMV BLD AUTO: 9.5 FL (ref 8.9–12.7)
POTASSIUM SERPL-SCNC: 4.2 MMOL/L (ref 3.5–5.3)
PROT SERPL-MCNC: 7.4 G/DL (ref 6.4–8.4)
RBC # BLD AUTO: 4.87 MILLION/UL (ref 3.88–5.62)
SODIUM SERPL-SCNC: 139 MMOL/L (ref 135–147)
TRIGL SERPL-MCNC: 128 MG/DL
TSH SERPL DL<=0.05 MIU/L-ACNC: 1.46 UIU/ML (ref 0.45–4.5)
WBC # BLD AUTO: 7.46 THOUSAND/UL (ref 4.31–10.16)

## 2024-01-15 PROCEDURE — 84443 ASSAY THYROID STIM HORMONE: CPT

## 2024-01-15 PROCEDURE — 36415 COLL VENOUS BLD VENIPUNCTURE: CPT

## 2024-01-15 PROCEDURE — 85027 COMPLETE CBC AUTOMATED: CPT

## 2024-01-15 PROCEDURE — 80061 LIPID PANEL: CPT

## 2024-01-15 PROCEDURE — 80053 COMPREHEN METABOLIC PANEL: CPT

## 2024-01-15 PROCEDURE — 87389 HIV-1 AG W/HIV-1&-2 AB AG IA: CPT

## 2024-01-18 ENCOUNTER — HOSPITAL ENCOUNTER (OUTPATIENT)
Dept: PULMONOLOGY | Facility: HOSPITAL | Age: 44
End: 2024-01-18
Attending: FAMILY MEDICINE
Payer: COMMERCIAL

## 2024-01-18 ENCOUNTER — HOSPITAL ENCOUNTER (OUTPATIENT)
Dept: NON INVASIVE DIAGNOSTICS | Facility: HOSPITAL | Age: 44
Discharge: HOME | End: 2024-01-18
Attending: FAMILY MEDICINE
Payer: COMMERCIAL

## 2024-01-18 VITALS
SYSTOLIC BLOOD PRESSURE: 124 MMHG | HEIGHT: 72 IN | BODY MASS INDEX: 30.88 KG/M2 | WEIGHT: 228 LBS | DIASTOLIC BLOOD PRESSURE: 68 MMHG | HEART RATE: 86 BPM | RESPIRATION RATE: 16 BRPM | OXYGEN SATURATION: 98 %

## 2024-01-18 DIAGNOSIS — R06.09 DYSPNEA ON EXERTION: ICD-10-CM

## 2024-01-18 LAB
MAX HR PERCENT: 101 %
MAX HR: 179 BPM
RATE PRESSURE PRODUCT: NORMAL
SL CV STRESS RECOVERY BP: NORMAL MMHG
SL CV STRESS RECOVERY HR: 110 BPM
SL CV STRESS RECOVERY O2 SAT: 97 %
STRESS ANGINA INDEX: 0
STRESS BASELINE BP: NORMAL MMHG
STRESS BASELINE HR: 86 BPM
STRESS O2 SAT REST: 98 %
STRESS PEAK HR: 179 BPM
STRESS POST ESTIMATED WORKLOAD: 11.7 METS
STRESS POST EXERCISE DUR MIN: 10 MIN
STRESS POST EXERCISE DUR SEC: 0 SEC
STRESS POST O2 SAT PEAK: 97 %
STRESS POST PEAK BP: 170 MMHG

## 2024-01-18 PROCEDURE — 93018 CV STRESS TEST I&R ONLY: CPT | Performed by: INTERNAL MEDICINE

## 2024-01-18 PROCEDURE — 94060 EVALUATION OF WHEEZING: CPT | Performed by: INTERNAL MEDICINE

## 2024-01-18 PROCEDURE — 94729 DIFFUSING CAPACITY: CPT | Performed by: INTERNAL MEDICINE

## 2024-01-18 PROCEDURE — 94060 EVALUATION OF WHEEZING: CPT

## 2024-01-18 PROCEDURE — 93016 CV STRESS TEST SUPVJ ONLY: CPT | Performed by: INTERNAL MEDICINE

## 2024-01-18 PROCEDURE — 93017 CV STRESS TEST TRACING ONLY: CPT

## 2024-01-18 PROCEDURE — 94726 PLETHYSMOGRAPHY LUNG VOLUMES: CPT | Performed by: INTERNAL MEDICINE

## 2024-01-18 PROCEDURE — 94760 N-INVAS EAR/PLS OXIMETRY 1: CPT

## 2024-01-18 PROCEDURE — 94729 DIFFUSING CAPACITY: CPT

## 2024-01-18 PROCEDURE — 94726 PLETHYSMOGRAPHY LUNG VOLUMES: CPT

## 2024-01-18 RX ORDER — ALBUTEROL SULFATE 2.5 MG/3ML
2.5 SOLUTION RESPIRATORY (INHALATION) ONCE
Status: COMPLETED | OUTPATIENT
Start: 2024-01-18 | End: 2024-01-18

## 2024-01-18 RX ADMIN — ALBUTEROL SULFATE 2.5 MG: 2.5 SOLUTION RESPIRATORY (INHALATION) at 07:50

## 2024-01-19 LAB
CHEST PAIN STATEMENT: NORMAL
MAX DIASTOLIC BP: 70 MMHG
MAX PREDICTED HEART RATE: 176 BPM
PROTOCOL NAME: NORMAL
STRESS POST EXERCISE DUR MIN: 10 MIN
STRESS POST EXERCISE DUR SEC: 0 SEC
STRESS POST PEAK HR: 179 BPM
STRESS POST PEAK SYSTOLIC BP: 184 MMHG
TARGET HR FORMULA: NORMAL
TEST INDICATION: NORMAL

## 2024-03-13 ENCOUNTER — CONSULT (OUTPATIENT)
Dept: CARDIOLOGY CLINIC | Facility: CLINIC | Age: 44
End: 2024-03-13
Payer: COMMERCIAL

## 2024-03-13 VITALS
BODY MASS INDEX: 30.2 KG/M2 | HEIGHT: 72 IN | WEIGHT: 223 LBS | HEART RATE: 70 BPM | DIASTOLIC BLOOD PRESSURE: 76 MMHG | SYSTOLIC BLOOD PRESSURE: 112 MMHG | OXYGEN SATURATION: 98 %

## 2024-03-13 DIAGNOSIS — R06.09 DYSPNEA ON EXERTION: Primary | ICD-10-CM

## 2024-03-13 DIAGNOSIS — I47.10 SVT (SUPRAVENTRICULAR TACHYCARDIA): ICD-10-CM

## 2024-03-13 DIAGNOSIS — I48.91 ATRIAL FIBRILLATION, UNSPECIFIED TYPE (HCC): ICD-10-CM

## 2024-03-13 PROCEDURE — 93000 ELECTROCARDIOGRAM COMPLETE: CPT | Performed by: INTERNAL MEDICINE

## 2024-03-13 PROCEDURE — 99244 OFF/OP CNSLTJ NEW/EST MOD 40: CPT | Performed by: INTERNAL MEDICINE

## 2024-03-13 NOTE — PROGRESS NOTES
Karthik Pachecoe  1980  [id number]  [facility]  [address]  [place] [id number] [phone]    1. Dyspnea on exertion  POCT ECG    AMB extended holter monitor    CT coronary calcium score      2. Atrial fibrillation, unspecified type (HCC)  Ambulatory Referral to Cardiology    POCT ECG    AMB extended holter monitor    CT coronary calcium score      3. SVT (supraventricular tachycardia)  POCT ECG    AMB extended holter monitor    CT coronary calcium score          Discussion/Summary: Lizzie Thapa in 2018 remains on beta-blocker and 81 mg aspirin.  Recent echocardiogram shows no underlying structural heart disease.  Stress test shows good exercise tolerance without ischemia.  He still has a fair amount of shortness of breath with exertion and sense that his heart races at times.  I think he may have supraventricular tachycardia I ordered a 2-week ambulatory Holter monitor to try to gain some rhythm/symptom correlation.  He has significant dyslipidemia with an LDL of 187 recommend coronary CT calcium score to help further risk stratify him.  Continue with diet and exercise.  Pulmonary function testing was within normal limits.  Consider sleep study if no other abnormalities are found.    Interval History: 43-year-old gentleman with history of lone A-fib in 2018, palpitations since childhood presents for new patient consultation on behalf of Dr. Agee.  He tells me as a child he would have a sense that his heart was racing he would have to hold his breath to get his heart to slow down.  This happened numerous times throughout his childhood.  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  Denies any anginal sounding chest pain he still has palpitations and flutters.  No lightheadedness dizziness or syncope.  There is been a lower extremity edema.   Recent echocardiogram shows structurally normal heart.  Treadmill performance was good he could have went further no angina on the treadmill.  No significant caffeine, alcohol.  No tobacco use.  There were some episodes of use of testosterone boosters in the past.    Medical Problems       Problem List       Epididymitis    A-fib (HCC)    Cervicalgia    Flu-like symptoms    Gastroesophageal reflux disease without esophagitis    Annual physical exam    Dyspnea on exertion    Chronic low back pain    SVT (supraventricular tachycardia)        Past Medical History:   Diagnosis Date    A-fib (HCC)     Bulging of cervical intervertebral disc     Herniated disc, cervical     Palpitations      Social History     Socioeconomic History    Marital status: /Civil Union     Spouse name: Not on file    Number of children: Not on file    Years of education: Not on file    Highest education level: Not on file   Occupational History    Not on file   Tobacco Use    Smoking status: Never    Smokeless tobacco: Never   Vaping Use    Vaping status: Never Used   Substance and Sexual Activity    Alcohol use: Yes     Comment: social     Drug use: No    Sexual activity: Yes     Partners: Female   Other Topics Concern    Not on file   Social History Narrative    Not on file     Social Determinants of Health     Financial Resource Strain: Low Risk  (12/28/2023)    Overall Financial Resource Strain (CARDIA)     Difficulty of Paying Living Expenses: Not hard at all   Food Insecurity: No Food Insecurity (12/28/2023)    Hunger Vital Sign     Worried About Running Out of Food in the Last Year: Never true     Ran Out of Food in the Last Year: Never true   Transportation Needs: No Transportation Needs (12/28/2023)    PRAPARE - Transportation     Lack of Transportation (Medical): No     Lack of Transportation (Non-Medical): No   Physical Activity: Sufficiently Active (12/28/2023)    Exercise Vital Sign     Days of Exercise per Week: 3 days      Minutes of Exercise per Session: 50 min   Stress: No Stress Concern Present (12/28/2023)    [employer] [employer] - Occupational Stress Questionnaire     Feeling of Stress : Only a little   Social Connections: Moderately Isolated (12/28/2023)    Social Connection and Isolation Panel [NHANES]     Frequency of Communication with Friends and Family: Twice a week     Frequency of Social Gatherings with Friends and Family: Once a week     Attends [employer] Services: Never     Active Member of Clubs or Organizations: No     Attends Club or Organization Meetings: Never     Marital Status:    Intimate Partner Violence: Not At Risk (12/28/2023)    Humiliation, Afraid, Rape, and Kick questionnaire     Fear of Current or Ex-Partner: No     Emotionally Abused: No     Physically Abused: No     Sexually Abused: No   Housing Stability: Low Risk  (12/28/2023)    Housing Stability Vital Sign     Unable to Pay for Housing in the Last Year: No     Number of Places Lived in the Last Year: 1     Unstable Housing in the Last Year: No      Family History   Problem Relation Age of Onset    Hyperlipidemia Father     Arrhythmia Maternal Grandfather     Atrial fibrillation Maternal Grandfather     Stroke Paternal Grandfather      No past surgical history on file.    Current Outpatient Medications:     aspirin 81 mg chewable tablet, Chew 1 tablet (81 mg total) daily, Disp: 60 tablet, Rfl: 0    esomeprazole (NexIUM) 20 mg capsule, Take 20 mg by mouth every morning before breakfast, Disp: , Rfl:     metoprolol succinate (TOPROL-XL) 25 mg 24 hr tablet, Take 1 tablet (25 mg total) by mouth daily, Disp: 90 tablet, Rfl: 3  No Known Allergies    Labs:     Chemistry        Component Value Date/Time    K 4.2 01/15/2024 1035     01/15/2024 1035    CO2 28 01/15/2024 1035    BUN 26 (H) 01/15/2024 1035    CREATININE 0.88 01/15/2024 1035        Component Value Date/Time    CALCIUM 9.4 01/15/2024 1035    ALKPHOS 77 01/15/2024  "1035    AST 14 01/15/2024 1035    ALT 21 01/15/2024 1035            Lab Results   Component Value Date    CHOL 192 08/10/2015    CHOL 195 08/26/2014     Lab Results   Component Value Date    HDL 39 (L) 01/15/2024    HDL 43 08/22/2018    HDL 32 (L) 08/03/2017     Lab Results   Component Value Date    LDLCALC 187 (H) 01/15/2024    LDLCALC 131 (H) 08/22/2018    LDLCALC 135 (H) 08/03/2017     Lab Results   Component Value Date    TRIG 128 01/15/2024    TRIG 160 (H) 08/22/2018    TRIG 225 (H) 08/03/2017     No results found for: \"CHOLHDL\"    Imaging: No results found.    ECG: Sinus rhythm short [place]      Review of Systems   Constitutional: Negative.   HENT: Negative.     Eyes: Negative.    Cardiovascular:  Positive for dyspnea on exertion and palpitations.   Respiratory: Negative.     Endocrine: Negative.    Hematologic/Lymphatic: Negative.    Skin: Negative.    Musculoskeletal: Negative.    Gastrointestinal: Negative.    Genitourinary: Negative.    Neurological: Negative.    Psychiatric/Behavioral: Negative.     All other systems reviewed and are negative.      Vitals:    03/13/24 1524   BP: 112/76   Pulse: 70   SpO2: 98%     Vitals:    03/13/24 1524   Weight: 101 kg (223 lb)     Height: 6' (182.9 cm)   Body mass index is 30.24 kg/m².    Physical Exam:  Vital signs reviewed.  General appearance:  Appears stated age, alert, well appearing and in no distress  HEENT:  PERRLA, EOMI, no scleral icterus, no conjunctival pallor  NECK:  Supple, No elevated JVP, no thyromegaly, no carotid bruits, no JVD  HEART:  Regular rate and rhythm, normal S1/S2, no S3/S4, no murmur or rub, PMI nondisplaced  LUNGS:  Clear to auscultation bilaterally, no wheezes rales or rhonchi  ABDOMEN:  Soft, non-tender, positive bowel sounds, no rebound or guarding, no organomegaly   EXTREMITIES:  Normal range of motion.  No clubbing or cyanosis. No edema  VASCULAR:  Normal pedal pulses   SKIN: No lesions or rashes on exposed skin  NEURO:  CN II-XII intact, " no focal deficits

## 2024-03-15 ENCOUNTER — TELEPHONE (OUTPATIENT)
Dept: CARDIOLOGY CLINIC | Facility: CLINIC | Age: 44
End: 2024-03-15

## 2024-03-15 NOTE — TELEPHONE ENCOUNTER
Called pt's insurance [employer].    Per insurance they're in network. No auth is required, for a 2 week Zio XT monitor. Pt has a $ 3,200 he has to meet. Coverage 80% after deductible.    Called [employer] per representative total for the monitor will be $418.15.    Reference # [id number]      Spoke with pt, made pt aware total amount for the monitor. Pt asked form of payment. Bill will be mailed to pt after completion on monitor.        2 week zio monitor ordered.

## 2024-03-19 ENCOUNTER — HOSPITAL ENCOUNTER (OUTPATIENT)
Dept: CT IMAGING | Facility: HOSPITAL | Age: 44
Discharge: HOME | End: 2024-03-19
Attending: INTERNAL MEDICINE
Payer: COMMERCIAL

## 2024-03-19 DIAGNOSIS — I48.91 ATRIAL FIBRILLATION, UNSPECIFIED TYPE (HCC): ICD-10-CM

## 2024-03-19 DIAGNOSIS — R06.09 DYSPNEA ON EXERTION: ICD-10-CM

## 2024-03-19 DIAGNOSIS — I47.10 SVT (SUPRAVENTRICULAR TACHYCARDIA): ICD-10-CM

## 2024-03-19 PROCEDURE — 75571 CT HRT W/O DYE W/CA TEST: CPT

## 2024-04-11 ENCOUNTER — CLINICAL SUPPORT (OUTPATIENT)
Dept: CARDIOLOGY CLINIC | Facility: CLINIC | Age: 44
End: 2024-04-11
Payer: COMMERCIAL

## 2024-04-11 DIAGNOSIS — R06.09 DYSPNEA ON EXERTION: ICD-10-CM

## 2024-04-11 DIAGNOSIS — I47.10 SVT (SUPRAVENTRICULAR TACHYCARDIA): ICD-10-CM

## 2024-04-11 DIAGNOSIS — I48.91 ATRIAL FIBRILLATION, UNSPECIFIED TYPE (HCC): ICD-10-CM

## 2024-04-11 PROCEDURE — 93248 EXT ECG>7D<15D REV&INTERPJ: CPT | Performed by: INTERNAL MEDICINE

## 2024-04-29 ENCOUNTER — OFFICE VISIT (OUTPATIENT)
Dept: CARDIOLOGY CLINIC | Facility: CLINIC | Age: 44
End: 2024-04-29
Payer: COMMERCIAL

## 2024-04-29 VITALS
DIASTOLIC BLOOD PRESSURE: 92 MMHG | OXYGEN SATURATION: 96 % | HEIGHT: 72 IN | SYSTOLIC BLOOD PRESSURE: 152 MMHG | HEART RATE: 82 BPM | BODY MASS INDEX: 30.28 KG/M2 | WEIGHT: 223.6 LBS

## 2024-04-29 DIAGNOSIS — R06.09 DYSPNEA ON EXERTION: ICD-10-CM

## 2024-04-29 DIAGNOSIS — I48.91 ATRIAL FIBRILLATION, UNSPECIFIED TYPE (HCC): Primary | ICD-10-CM

## 2024-04-29 DIAGNOSIS — I47.10 SVT (SUPRAVENTRICULAR TACHYCARDIA): ICD-10-CM

## 2024-04-29 DIAGNOSIS — I48.0 PAROXYSMAL ATRIAL FIBRILLATION (HCC): ICD-10-CM

## 2024-04-29 PROCEDURE — 93000 ELECTROCARDIOGRAM COMPLETE: CPT | Performed by: INTERNAL MEDICINE

## 2024-04-29 PROCEDURE — 99214 OFFICE O/P EST MOD 30 MIN: CPT | Performed by: INTERNAL MEDICINE

## 2024-04-29 RX ORDER — METOPROLOL SUCCINATE 50 MG/1
50 TABLET, EXTENDED RELEASE ORAL DAILY
Qty: 90 TABLET | Refills: 3 | Status: SHIPPED | OUTPATIENT
Start: 2024-04-29

## 2024-04-29 NOTE — PROGRESS NOTES
Karthik Pachecoe  1980  [id number]  [facility]  [address]  [place] [address] [phone]    1. Atrial fibrillation, unspecified type (HCC)  POCT ECG      2. SVT (supraventricular tachycardia)        3. Dyspnea on exertion        4. Paroxysmal atrial fibrillation (HCC)  metoprolol succinate (TOPROL-XL) 50 mg 24 hr tablet          Discussion/Summary: Patient's event recorder showed episodes of fast supraventricular tachycardia.  We talked about conservative treatment strategies increase metoprolol to 50 mg daily.  Blood pressure is normally well-controlled.  Continue to follow I think he was little nervous with the visit today.  Coronary calcium score was 0.  I scheduled him to see electrophysiology to talk about potential SVT ablation.      Interval History: 43-year-old gentleman with history of lone A-fib in 2018, palpitations since childhood presents for new patient consultation on behalf of Dr. Agee.  He tells me as a child he would have a sense that his heart was racing he would have to hold his breath to get his heart to slow down.  This happened numerous times throughout his childhood.  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  Denies any anginal sounding chest pain he still has palpitations and flutters.  No lightheadedness dizziness or syncope.  T    Overall he has been feeling well.  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  Denies any anginal sounding chest pain or discomfort.  There has been no syncope.  Denies any significant dyspnea.  Medical Problems       Problem List       Epididymitis    A-fib (HCC)    Cervicalgia    Flu-like symptoms    Gastroesophageal reflux disease without esophagitis     Annual physical exam    Dyspnea on exertion    Chronic low back pain    SVT (supraventricular tachycardia)        Past Medical History:   Diagnosis Date    A-fib (HCC)     Bulging of cervical intervertebral disc     Herniated disc, cervical     Palpitations      Social History     Socioeconomic History    Marital status: /Civil Union     Spouse name: Not on file    Number of children: Not on file    Years of education: Not on file    Highest education level: Not on file   Occupational History    Not on file   Tobacco Use    Smoking status: Never    Smokeless tobacco: Never   Vaping Use    Vaping status: Never Used   Substance and Sexual Activity    Alcohol use: Yes     Comment: social     Drug use: No    Sexual activity: Yes     Partners: Female   Other Topics Concern    Not on file   Social History Narrative    Not on file     Social Determinants of Health     Financial Resource Strain: Low Risk  (12/28/2023)    Overall Financial Resource Strain (CARDIA)     Difficulty of Paying Living Expenses: Not hard at all   Food Insecurity: No Food Insecurity (12/28/2023)    Hunger Vital Sign     Worried About Running Out of Food in the Last Year: Never true     Ran Out of Food in the Last Year: Never true   Transportation Needs: No Transportation Needs (12/28/2023)    PRAPARE - Transportation     Lack of Transportation (Medical): No     Lack of Transportation (Non-Medical): No   Physical Activity: Sufficiently Active (12/28/2023)    Exercise Vital Sign     Days of Exercise per Week: 3 days     Minutes of Exercise per Session: 50 min   Stress: No Stress Concern Present (12/28/2023)    [employer] - Occupational Stress Questionnaire     Feeling of Stress : Only a little   Social Connections: Moderately Isolated (12/28/2023)    Social Connection and Isolation Panel [NHANES]     Frequency of Communication with Friends and Family: Twice a week     Frequency of Social Gatherings with Friends and  Family: Once a week     Attends Buddhism Services: Never     Active Member of Clubs or Organizations: No     Attends Club or Organization Meetings: Never     Marital Status:    Intimate Partner Violence: Not At Risk (12/28/2023)    Humiliation, Afraid, Rape, and Kick questionnaire     Fear of Current or Ex-Partner: No     Emotionally Abused: No     Physically Abused: No     Sexually Abused: No   Housing Stability: Low Risk  (12/28/2023)    Housing Stability Vital Sign     Unable to Pay for Housing in the Last Year: No     Number of Places Lived in the Last Year: 1     Unstable Housing in the Last Year: No      Family History   Problem Relation Age of Onset    Hyperlipidemia Father     Arrhythmia Maternal Grandfather     Atrial fibrillation Maternal Grandfather     Stroke Paternal Grandfather      History reviewed. No pertinent surgical history.    Current Outpatient Medications:     aspirin 81 mg chewable tablet, Chew 1 tablet (81 mg total) daily, Disp: 60 tablet, Rfl: 0    esomeprazole (NexIUM) 20 mg capsule, Take 20 mg by mouth every morning before breakfast, Disp: , Rfl:     metoprolol succinate (TOPROL-XL) 50 mg 24 hr tablet, Take 1 tablet (50 mg total) by mouth daily, Disp: 90 tablet, Rfl: 3  No Known Allergies    Labs:     Chemistry        Component Value Date/Time    K 4.2 01/15/2024 1035     01/15/2024 1035    CO2 28 01/15/2024 1035    BUN 26 (H) 01/15/2024 1035    CREATININE 0.88 01/15/2024 1035        Component Value Date/Time    CALCIUM 9.4 01/15/2024 1035    ALKPHOS 77 01/15/2024 1035    AST 14 01/15/2024 1035    ALT 21 01/15/2024 1035            Lab Results   Component Value Date    CHOL 192 08/10/2015    CHOL 195 08/26/2014     Lab Results   Component Value Date    HDL 39 (L) 01/15/2024    HDL 43 08/22/2018    HDL 32 (L) 08/03/2017     Lab Results   Component Value Date    LDLCALC 187 (H) 01/15/2024    LDLCALC 131 (H) 08/22/2018    LDLCALC 135 (H) 08/03/2017     Lab Results   Component  "Value Date    TRIG 128 01/15/2024    TRIG 160 (H) 08/22/2018    TRIG 225 (H) 08/03/2017     No results found for: \"CHOLHDL\"    Imaging: No results found.    ECG: Sinus rhythm short NH      Review of Systems   Constitutional: Negative.   HENT: Negative.     Eyes: Negative.    Cardiovascular:  Positive for dyspnea on exertion and palpitations.   Respiratory: Negative.     Endocrine: Negative.    Hematologic/Lymphatic: Negative.    Skin: Negative.    Musculoskeletal: Negative.    Gastrointestinal: Negative.    Genitourinary: Negative.    Neurological: Negative.    Psychiatric/Behavioral: Negative.     All other systems reviewed and are negative.      Vitals:    04/29/24 0748   BP: 152/92   Pulse: 82   SpO2: 96%     Vitals:    04/29/24 0748   Weight: 101 kg (223 lb 9.6 oz)     Height: 6' (182.9 cm)   Body mass index is 30.33 kg/m².    Physical Exam:  Vital signs reviewed  General:  Alert and cooperative, appears stated age, no acute distress  HEENT:  PERRLA, EOMI, no scleral icterus, no conjunctival pallor  Neck:  No lymphadenopathy, no thyromegaly, no carotid bruits, no elevated JVP  Heart:  Regular rate and rhythm, normal S1/S2, no S3/S4, no murmur, rubs or gallops.  PMI nondisplaced  Lungs:  Clear to auscultation bilaterally, no wheezes rales or rhonchi  Abdomen:  Soft, non-tender, positive bowel sounds, no rebound or guarding,   no organomegaly   Extremities:  Normal range of motion.  No clubbing, cyanosis or edema   Vascular:  2+ pedal pulses  Skin:  No rashes or lesions on exposed skin  Neurologic:  Cranial nerves II-XII grossly intact without focal deficits  Psych:  Normal mood and affect      "

## 2024-06-05 ENCOUNTER — OFFICE VISIT (OUTPATIENT)
Dept: CARDIOLOGY CLINIC | Facility: CLINIC | Age: 44
End: 2024-06-05
Payer: COMMERCIAL

## 2024-06-05 VITALS
DIASTOLIC BLOOD PRESSURE: 88 MMHG | WEIGHT: 224.2 LBS | HEART RATE: 71 BPM | SYSTOLIC BLOOD PRESSURE: 126 MMHG | BODY MASS INDEX: 30.37 KG/M2 | HEIGHT: 72 IN

## 2024-06-05 DIAGNOSIS — I47.10 SVT (SUPRAVENTRICULAR TACHYCARDIA): ICD-10-CM

## 2024-06-05 DIAGNOSIS — I48.91 ATRIAL FIBRILLATION, UNSPECIFIED TYPE (HCC): Primary | ICD-10-CM

## 2024-06-05 PROCEDURE — 93000 ELECTROCARDIOGRAM COMPLETE: CPT | Performed by: INTERNAL MEDICINE

## 2024-06-05 PROCEDURE — 99244 OFF/OP CNSLTJ NEW/EST MOD 40: CPT | Performed by: INTERNAL MEDICINE

## 2024-06-05 NOTE — PROGRESS NOTES
EPS Consultation/New Patient Evaluation - Karthik Palmer 43 y.o. male MRN: [id number]       Referring:Dr. Dinero    CC/HPI:   It was a pleasure to see Karthik Palmer in our arrhythmia clinic at [facility]. As you know he is a 43 y.o. man with paroxysmal atrial fibrillation and SVT who presents to discuss management of atrial arrhythmias.    He has been diagnosed with atrial fibrillation since 2018.  He reports having palpitations since childhood.  He would have to hold his breath when he was a child to slow down the heart rate.  It happened a number of times during his childhood.  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  Denies any chest pain but does notice palpitations.  Denies any dizziness, lightheadedness.    He had cardiac event monitor which showed episode of SVT.  He was started on Toprol-XL 50 mg daily.    He has not felt significant palpitations since being on Toprol XL. In 2018 he had felt fatigue and was found to be in atrial fibrillation. ECG confirmed atrial fibrillation which lasted several hours. He has had similar sensation twice since then.     Past Medical History:  Past Medical History:   Diagnosis Date    A-fib (HCC)     Bulging of cervical intervertebral disc     Herniated disc, cervical     Palpitations        Medications:      Current Outpatient Medications:     aspirin 81 mg chewable tablet, Chew 1 tablet (81 mg total) daily, Disp: 60 tablet, Rfl: 0    esomeprazole (NexIUM) 20 mg capsule, Take 20 mg by mouth every morning before breakfast, Disp: , Rfl:     metoprolol succinate (TOPROL-XL) 50 mg 24 hr tablet, Take 1 tablet (50 mg total) by mouth daily, Disp: 90 tablet, Rfl: 3     Family History   Problem Relation Age of Onset    Hyperlipidemia Father     Arrhythmia Maternal Grandfather     Atrial fibrillation Maternal Grandfather     Stroke Paternal Grandfather      Social  History     Socioeconomic History    Marital status: /Civil Union     Spouse name: Not on file    Number of children: Not on file    Years of education: Not on file    Highest education level: Not on file   Occupational History    Not on file   Tobacco Use    Smoking status: Never    Smokeless tobacco: Never   Vaping Use    Vaping status: Never Used   Substance and Sexual Activity    Alcohol use: Yes     Comment: social     Drug use: No    Sexual activity: Yes     Partners: Female   Other Topics Concern    Not on file   Social History Narrative    Not on file     Social Determinants of Health     Financial Resource Strain: Low Risk  (12/28/2023)    Overall Financial Resource Strain (CARDIA)     Difficulty of Paying Living Expenses: Not hard at all   Food Insecurity: No Food Insecurity (12/28/2023)    Hunger Vital Sign     Worried About Running Out of Food in the Last Year: Never true     Ran Out of Food in the Last Year: Never true   Transportation Needs: No Transportation Needs (12/28/2023)    PRAPARE - Transportation     Lack of Transportation (Medical): No     Lack of Transportation (Non-Medical): No   Physical Activity: Sufficiently Active (12/28/2023)    Exercise Vital Sign     Days of Exercise per Week: 3 days     Minutes of Exercise per Session: 50 min   Stress: No Stress Concern Present (12/28/2023)    [employer] - Occupational Stress Questionnaire     Feeling of Stress : Only a little   Social Connections: Moderately Isolated (12/28/2023)    Social Connection and Isolation Panel [NHANES]     Frequency of Communication with Friends and Family: Twice a week     Frequency of Social Gatherings with Friends and Family: Once a week     Attends Congregational Services: Never     Active Member of Clubs or Organizations: No     Attends Club or Organization Meetings: Never     Marital Status:    Intimate Partner Violence: Not At Risk (12/28/2023)    Humiliation, Afraid, Rape,  and Kick questionnaire     Fear of Current or Ex-Partner: No     Emotionally Abused: No     Physically Abused: No     Sexually Abused: No   Housing Stability: Low Risk  (12/28/2023)    Housing Stability Vital Sign     Unable to Pay for Housing in the Last Year: No     Number of Places Lived in the Last Year: 1     Unstable Housing in the Last Year: No     Social History     Tobacco Use   Smoking Status Never   Smokeless Tobacco Never     Social History     Substance and Sexual Activity   Alcohol Use Yes    Comment: social        Review of Systems   Constitutional: Negative for chills and fever.   HENT: Negative.     Eyes:  Negative for blurred vision and double vision.   Cardiovascular:  Positive for palpitations. Negative for chest pain, dyspnea on exertion, leg swelling, near-syncope, orthopnea, paroxysmal nocturnal dyspnea and syncope.   Respiratory:  Negative for cough and sputum production.    Endocrine: Negative.    Skin: Negative.  Negative for rash.   Musculoskeletal: Negative.  Negative for arthritis and joint pain.   Gastrointestinal:  Negative for abdominal pain, nausea and vomiting.   Genitourinary: Negative.    Neurological: Negative.  Negative for dizziness and light-headedness.   Psychiatric/Behavioral: Negative.  The patient is not nervous/anxious.         Objective:     Vitals: Blood pressure 126/88, pulse 71, height 6' (1.829 m), weight 102 kg (224 lb 3.2 oz)., Body mass index is 30.41 kg/m².,        Physical Exam:    GEN: Karthik Palmer appears well, alert and oriented x 3, pleasant and cooperative   HEENT: pupils equal, round, and reactive to light; extraocular muscles intact  NECK: supple, no carotid bruits   HEART: regular rhythm, normal S1 and S2, no murmurs, clicks, gallops or rubs   LUNGS: clear to auscultation bilaterally; no wheezes, rales, or rhonchi   ABDOMEN: normal bowel sounds, soft, no tenderness, no distention  EXTREMITIES: peripheral pulses normal; no clubbing, cyanosis, or  edema  NEURO: no focal findings   SKIN: normal without suspicious lesions on exposed skin      Labs & Results:  Below is the patient's most recent value for Albumin, ALT, AST, BUN, Calcium, Chloride, Cholesterol, CO2, Creatinine, GFR, Glucose, HDL, Hematocrit, Hemoglobin, Hemoglobin A1C, LDL, Magnesium, Phosphorus, Platelets, Potassium, PSA, Sodium, Triglycerides, and WBC.   Lab Results   Component Value Date    ALT 21 01/15/2024    AST 14 01/15/2024    BUN 26 (H) 01/15/2024    CALCIUM 9.4 01/15/2024     01/15/2024    CHOL 192 08/10/2015    CO2 28 01/15/2024    CREATININE 0.88 01/15/2024    HDL 39 (L) 01/15/2024    HCT 47.1 01/15/2024    HGB 15.3 01/15/2024    HGBA1C 5.2 2018     01/15/2024    K 4.2 01/15/2024    TRIG 128 01/15/2024    WBC 7.46 01/15/2024     Note: for a comprehensive list of the patient's lab results, access the Results Review activity.          Cardiac testing:     I personally reviewed the ECG performed in the clinic on 24. It reveals normal sinus rhythm.    Echocardiograms:  Results for orders placed during the hospital encounter of 18    Echo complete with contrast if indicated    [place] [address]  [phone]    Transthoracic Echocardiogram  2D, M-mode, Doppler, and Color Doppler    Study date:  29-Mar-2018    Patient: TOM LAKHANI  MR number: [id number]  Account number: [id number]  : 1980  Age: 37 years  Gender: Male  Status: Outpatient  Location: Echo lab  Height: 72 in  Weight: 200 lb  BP: 132/ 74 mmHg    Indications: Atrial fibrillation.    Diagnoses: I48.0 - Atrial fibrillation    Sonographer:  PADMINI Lopez  Primary Physician:  Yury Marshall MD  Referring Physician:  Agata Cardona DO  Group:  [facility]  Interpreting Physician:  Jaron Aceves MD    SUMMARY    LEFT VENTRICLE:  Systolic function was normal. Ejection fraction was estimated  to be 60 %.  There were no regional wall motion abnormalities.  There was no evidence of concentric hypertrophy.    MITRAL VALVE:  There was trace regurgitation.    TRICUSPID VALVE:  There was trace regurgitation.    HISTORY: PRIOR HISTORY: Patient has no history of cardiovascular disease.    PROCEDURE: The procedure was performed in the echo lab. This was a routine study. The transthoracic approach was used. The study included complete 2D imaging, M-mode, complete spectral Doppler, and color Doppler. Image quality was  adequate.    LEFT VENTRICLE: Size was normal. Systolic function was normal. Ejection fraction was estimated to be 60 %. There were no regional wall motion abnormalities. Wall thickness was normal. There was no evidence of concentric hypertrophy.  DOPPLER: Left ventricular diastolic function parameters were normal.    RIGHT VENTRICLE: The size was normal. Systolic function was normal. Wall thickness was normal.    LEFT ATRIUM: Size was normal.    RIGHT ATRIUM: Size was normal.    MITRAL VALVE: Valve structure was normal. There was normal leaflet separation. DOPPLER: The transmitral velocity was within the normal range. There was no evidence for stenosis. There was trace regurgitation.    AORTIC VALVE: The valve was trileaflet. Leaflets exhibited normal thickness and normal cuspal separation. DOPPLER: Transaortic velocity was within the normal range. There was no evidence for stenosis. There was no regurgitation.    TRICUSPID VALVE: The valve structure was normal. There was normal leaflet separation. DOPPLER: The transtricuspid velocity was within the normal range. There was no evidence for stenosis. There was trace regurgitation. Pulmonary artery  systolic pressure was within the normal range.    PULMONIC VALVE: Leaflets exhibited normal thickness, no calcification, and normal cuspal separation. DOPPLER: The transpulmonic velocity was within the normal range. There was no  regurgitation.    PERICARDIUM: There was no pericardial effusion. The pericardium was normal in appearance.    AORTA: The root exhibited normal size.    SYSTEM MEASUREMENT TABLES    2D  %FS: 36.78 %  Ao Diam: 2.41 cm  EDV(Teich): 91.52 ml  EF(Teich): 66.77 %  ESV(Teich): 30.41 ml  IVSd: 0.84 cm  LA Area: 12.69 cm2  LA Diam: 3.18 cm  LVEDV MOD A4C: 98.11 ml  LVEF MOD A4C: 70.78 %  LVESV MOD A4C: 28.67 ml  LVIDd: 4.48 cm  LVIDs: 2.83 cm  LVLd A4C: 8.54 cm  LVLs A4C: 6.04 cm  LVPWd: 0.71 cm  RA Area: 15.61 cm2  RVIDd: 4.1 cm  SV MOD A4C: 69.44 ml  SV(Teich): 61.11 ml    CW  TR Vmax: 2.36 m/s  TR maxP.36 mmHg    MM  TAPSE: 2.3 cm    PW  E': 0.09 m/s  E/E': 8.85  MV A Man: 0.52 m/s  MV Dec Otoe: 4.11 m/s2  MV DecT: 183.07 ms  MV E Man: 0.75 m/s  MV E/A Ratio: 1.45  MV PHT: 53.09 ms  MVA By PHT: 4.14 cm2    [facility] Accredited Echocardiography Laboratory    Prepared and electronically signed by    Jaron Aceves MD  Signed 29-Mar-2018 13:27:19    No results found for this or any previous visit.      Catheterizations:   No results found for this or any previous visit.      Stress Tests:  No results found for this or any previous visit.      Holter monitor -   No results found for this or any previous visit.    No results found for this or any previous visit.        ASSESSMENT/PLAN:  SVT  Occurring since childhood  Has been able to break it with valsalva maneuvers  Cardiac monitor showed brief episodes of SVT  Recommend ablation procedure to eliminate SVT, and discussed the procedure in detail  He will think about it and let us know  Continue metoprolol in the meantime  Atrial fibrillation  Diagnosed in 2018  ECG confirmed it was atrial fibrillation and not SVT  Patient has had symptoms similar to initial episode two more times  Discussed ablation at time of SVT ablation and he is in agreement but will let us know if he would like to proceed with procedure   CHADS2-Vasc is 0; Only on Aspirin 81 mg  daily   GERD  Maintained on PPI

## 2024-06-05 NOTE — PATIENT INSTRUCTIONS
Continue metoprolol     Let us know if you would like to proceed with ablation for SVT and atrial fibrillation

## 2025-05-17 DIAGNOSIS — I48.0 PAROXYSMAL ATRIAL FIBRILLATION (HCC): ICD-10-CM

## 2025-05-19 RX ORDER — METOPROLOL SUCCINATE 50 MG/1
50 TABLET, EXTENDED RELEASE ORAL DAILY
Qty: 90 TABLET | Refills: 1 | Status: SHIPPED | OUTPATIENT
Start: 2025-05-19

## 2025-05-19 NOTE — TELEPHONE ENCOUNTER
Requested medication(s) are due for refill today: Yes  Patient has already received a courtesy refill: No  Other reason request has been forwarded to provider: Per refill pod, provider needs to approve Rx

## 2025-08-04 ENCOUNTER — OFFICE VISIT (OUTPATIENT)
Dept: FAMILY MEDICINE CLINIC | Facility: CLINIC | Age: 45
End: 2025-08-04

## 2025-08-04 VITALS
OXYGEN SATURATION: 98 % | WEIGHT: 225.2 LBS | RESPIRATION RATE: 18 BRPM | HEART RATE: 79 BPM | DIASTOLIC BLOOD PRESSURE: 78 MMHG | HEIGHT: 72 IN | TEMPERATURE: 98.2 F | SYSTOLIC BLOOD PRESSURE: 119 MMHG | BODY MASS INDEX: 30.5 KG/M2

## 2025-08-04 DIAGNOSIS — Z13.6 SCREENING FOR CARDIOVASCULAR CONDITION: ICD-10-CM

## 2025-08-04 DIAGNOSIS — Z00.00 ANNUAL PHYSICAL EXAM: Primary | ICD-10-CM

## 2025-08-04 DIAGNOSIS — Z11.1 SCREENING-PULMONARY TB: ICD-10-CM

## 2025-08-04 DIAGNOSIS — Z23 ENCOUNTER FOR IMMUNIZATION: ICD-10-CM

## 2025-08-04 PROCEDURE — 90715 TDAP VACCINE 7 YRS/> IM: CPT

## 2025-08-04 PROCEDURE — 90471 IMMUNIZATION ADMIN: CPT

## 2025-08-04 PROCEDURE — 99396 PREV VISIT EST AGE 40-64: CPT
